# Patient Record
Sex: MALE | Race: WHITE | Employment: OTHER | URBAN - METROPOLITAN AREA
[De-identification: names, ages, dates, MRNs, and addresses within clinical notes are randomized per-mention and may not be internally consistent; named-entity substitution may affect disease eponyms.]

---

## 2017-01-06 ENCOUNTER — OFFICE VISIT (OUTPATIENT)
Dept: CARDIOLOGY CLINIC | Age: 67
End: 2017-01-06

## 2017-01-06 VITALS
SYSTOLIC BLOOD PRESSURE: 138 MMHG | DIASTOLIC BLOOD PRESSURE: 92 MMHG | HEART RATE: 79 BPM | BODY MASS INDEX: 27.73 KG/M2 | OXYGEN SATURATION: 97 % | HEIGHT: 75 IN | WEIGHT: 223 LBS

## 2017-01-06 DIAGNOSIS — E78.00 PURE HYPERCHOLESTEROLEMIA: ICD-10-CM

## 2017-01-06 DIAGNOSIS — I11.9 BENIGN HYPERTENSIVE HEART DISEASE WITHOUT HEART FAILURE: Primary | ICD-10-CM

## 2017-01-06 DIAGNOSIS — R20.2 PARESTHESIA OF BOTH FEET: ICD-10-CM

## 2017-01-06 DIAGNOSIS — I25.10 CORONARY ARTERY DISEASE INVOLVING NATIVE CORONARY ARTERY OF NATIVE HEART WITHOUT ANGINA PECTORIS: ICD-10-CM

## 2017-01-06 RX ORDER — AMLODIPINE BESYLATE 5 MG/1
5 TABLET ORAL DAILY
Qty: 90 TAB | Refills: 3 | Status: SHIPPED | OUTPATIENT
Start: 2017-01-06 | End: 2017-01-06 | Stop reason: SDUPTHER

## 2017-01-06 NOTE — MR AVS SNAPSHOT
Visit Information Date & Time Provider Department Dept. Phone Encounter #  
 1/6/2017  8:00 AM Samia Pierce MD [facility] [id number] Your Appointments 2/17/2017  8:20 AM  
Follow Up with Samia Pierce MD  
[facility] ([facility]) Appt Note: 6 week follow up Elidia Layne [phone] [facility] [address] Upcoming Health Maintenance Date Due Hepatitis C Screening 1950 DTaP/Tdap/Td series (1 - Tdap) 8/1/1971 FOBT Q 1 YEAR AGE 50-75 8/1/2000 ZOSTER VACCINE AGE 60> 8/1/2010 GLAUCOMA SCREENING Q2Y 8/1/2015 Pneumococcal 65+ Low/Medium Risk (1 of 2 - PCV13) 8/1/2015 MEDICARE YEARLY EXAM 8/1/2015 INFLUENZA AGE 9 TO ADULT 8/1/2016 Allergies as of 1/6/2017  Review Complete On: 1/6/2017 By: Piero Roberts No Known Allergies Current Immunizations  Never Reviewed No immunizations on file. Not reviewed this visit You Were Diagnosed With   
  
 Codes Comments Benign hypertensive heart disease without heart failure    -  Primary ICD-10-CM: I11.9 ICD-9-CM: 402.10 Coronary artery disease involving native coronary artery of native heart without angina pectoris     ICD-10-CM: I25.10 ICD-9-CM: 414.01   
 Pure hypercholesterolemia     ICD-10-CM: E78.00 ICD-9-CM: 272.0 Paresthesia of both feet     ICD-10-CM: R20.2 ICD-9-CM: 146. 0 Vitals BP Pulse Height(growth percentile) Weight(growth percentile) SpO2 BMI  
 (!) 138/92 79 6' 3\" (1.905 m) 223 lb (101.2 kg) 97% 27.87 kg/m2 Smoking Status Current Every Day Smoker BMI and BSA Data Body Mass Index Body Surface Area  
 27.87 kg/m 2 2.31 m 2 Preferred Pharmacy Pharmacy Name Phone [id number] [address] [place] [address] Your Updated Medication List  
  
   
This list is accurate as of: 1/6/17  8:38 AM.  Always use your most recent med list. ALTACE 10 mg capsule Generic drug:  ramipril Take 10 mg by mouth daily. amLODIPine 5 mg tablet Commonly known as:  Estephanie Cater Take 1 Tab by mouth daily. Indications: Hypertension  
  
 aspirin 81 mg tablet Take 81 mg by mouth daily. atorvastatin 40 mg tablet Commonly known as:  LIPITOR  
TAKE 1 TAB BY MOUTH EVERY NIGHT AT BEDTIME.  
  
 cholecalciferol 400 unit Tab tablet Commonly known as:  VITAMIN D3 Take 400 Units by mouth daily. CIALIS 5 mg tablet Generic drug:  tadalafil Take 1 Tab by mouth daily. For BPH. Indications: SYMPTOMATIC BENIGN PROSTATIC HYPERPLASIA * dutasteride 0.5 mg capsule Commonly known as:  AVODART Take 1 Cap by mouth as needed. * dutasteride 0.5 mg capsule Commonly known as:  AVODART Take 1 Cap by mouth Every Friday. VITAMIN B-12 PO Take  by mouth. VITAMIN C 250 mg tablet Generic drug:  ascorbic acid (vitamin C) Take 250 mg by mouth daily. VITAMIN E PO Take  by mouth daily. * Notice: This list has 2 medication(s) that are the same as other medications prescribed for you. Read the directions carefully, and ask your doctor or other care provider to review them with you. Prescriptions Sent to Pharmacy Refills  
 amLODIPine (NORVASC) 5 mg tablet 3 Sig: Take 1 Tab by mouth daily. Indications: Hypertension Class: Normal  
 Pharmacy: [address], [address] Ph #: [phone] Route: Oral  
  
Introducing [facility] & HEALTH SERVICES! Vanessa Jimenez introduces [employer] patient portal. Now you can access parts of your medical record, email your doctor's office, and request medication refills online. 1. In your internet browser, go to [email or web address] 2. Click on the First Time User? Click Here link in the Sign In box. You will see the New Member Sign Up page. 3. Enter your [employer] Access Code exactly as it appears below. You will not need to use this code after youve completed the sign-up process. If you do not sign up before the expiration date, you must request a new code. · [employer] Access Code: [email or web address] Expires: 4/6/2017  7:55 AM 
 
4. Enter the last four digits of your Social Security Number (xxxx) and Date of Birth (mm/dd/yyyy) as indicated and click Submit. You will be taken to the next sign-up page. 5. Create a [employer] ID. This will be your [employer] login ID and cannot be changed, so think of one that is secure and easy to remember. 6. Create a [employer] password. You can change your password at any time. 7. Enter your Password Reset Question and Answer. This can be used at a later time if you forget your password. 8. Enter your e-mail address. You will receive e-mail notification when new information is available in [address] 9. Click Sign Up. You can now view and download portions of your medical record. 10. Click the Download Summary menu link to download a portable copy of your medical information. If you have questions, please visit the Frequently Asked Questions section of the [employer] website. Remember, [employer] is NOT to be used for urgent needs. For medical emergencies, dial 911. Now available from your iPhone and Android! Please provide this summary of care documentation to your next provider. Your primary care clinician is listed as Roshan Rojas. If you have any questions after today's visit, please call [phone].

## 2017-01-06 NOTE — PROGRESS NOTES
1. Have you been to the ER, urgent care clinic since your last visit? Hospitalized since your last visit? No  2. Have you seen or consulted any other health care providers outside of the [address] [place] [address] since your last visit? Include any pap smears or colon screening.  NO

## 2017-01-06 NOTE — PROGRESS NOTES
\"       PATIENT NAME: Rocio Be         77 y.o.      1950              DATE:1/6/2017    REASON FOR VISIT:hypertension. HISTORY OF PRESENT ILLNESS:Blood pressures remain poorly controlled. The problems in the lower extremities are unchanged. He has been told that he has a peripheral neuropathy. Denies chest pain and other symptoms suggestive of angina. Denies BAXTER, PND, orthopnea. Denies palpitation, syncope, presyncope. Denies edema in the lower extremities. PAST MEDICAL HISTORY:   Past Medical History:    Bilateral cataracts                                             Comment:removed in 2005    CAD (coronary artery disease), native coronary*                 Comment:mild by invasive eval (oct 2007)    Calculus of kidney                                            ED (erectile dysfunction)                                     GERD (gastroesophageal reflux disease)                        Gross hematuria                                               Hypercholesterolemia                                          Hypertension                                                  Hypertension                                                  Incomplete bladder emptying                                   Nephrolithiasis                                               Normal nuclear stress test                      06/15/2010      Comment:No ischemia or infarction. EF 64%. Ex time                13:30. Peyronie's disease                                            S/P cardiac cath                                10/05/2007      Comment:LM patent. pLAD <20% plaquing. oLCX 30%. RCA               patent. LVEDP 20.  EF 60%. Mild arterial                hypertension.     Tobacco use disorder                                            Comment:ongoing (July 2011)    Urinary retention                                             PAST SURGICAL HISTORY:   Past Surgical History:    HX CHOLECYSTECTOMY HX HERNIA REPAIR                                Left                 Comment:inguinal    HX SHOULDER ARTHROSCOPY                         Right               HX CATARACT REMOVAL                             Bilateral               HX HEENT                                                         Comment:retina repair    HX UROLOGICAL                                    8/18/2014       Comment:[facility], Dr. Gretchen Madrid Prostatectomy      SOCIAL HISTORY:  Social History    Marital status:              Spouse name:                       Years of education:                 Number of children:               Social History Main Topics    Smoking status: Current Every Day Smoker                                                     Packs/day: 1.00      Years: 25.00       Smokeless status: Never Used                        Comment: less than one PPD; Alcohol use: Yes                Comment: occasionally drinks wine    Drug use: No                ALLERGIES:   No Known Allergies     CURRENT MEDICATIONS:   Current Outpatient Prescriptions:  CYANOCOBALAMIN, VITAMIN B-12, (VITAMIN B-12 PO), Take  by mouth. atorvastatin (LIPITOR) 40 mg tablet, TAKE 1 TAB BY MOUTH EVERY NIGHT AT BEDTIME.  amLODIPine (NORVASC) 2.5 mg tablet, Take 1 Tab by mouth daily. CIALIS 5 mg tablet, Take 1 Tab by mouth daily. For BPH. Indications: SYMPTOMATIC BENIGN PROSTATIC HYPERPLASIA  dutasteride (AVODART) 0.5 mg capsule, Take 1 Cap by mouth as needed. cholecalciferol (VITAMIN D3) 400 unit tab tablet, Take 400 Units by mouth daily. ascorbic acid (VITAMIN C) 250 mg tablet, Take 250 mg by mouth daily. aspirin 81 mg tablet, Take 81 mg by mouth daily. VITAMIN E ACETATE (VITAMIN E PO), Take  by mouth daily. ramipril (ALTACE) 10 mg capsule, Take 10 mg by mouth daily. dutasteride (AVODART) 0.5 mg capsule, Take 1 Cap by mouth Every Friday. No current facility-administered medications for this visit.        REVIEW of SYSTEMS:Respiratory ROS: Please see history of present illness  Cardiovascular ROS: Please see history of present illness     PHYSICAL EXAMINATION:   BP (!) 138/92  Pulse 79  Ht 6' 3\" (1.905 m)  Wt 101.2 kg (223 lb)  SpO2 97%  BMI 27.87 kg/m2  BP Readings from Last 3 Encounters:  01/06/17 : (!) 138/92  12/02/16 : (!) 140/98  09/14/16 : (!) 142/96    Pulse Readings from Last 3 Encounters:  01/06/17 : 79  12/02/16 : (!) 51  02/15/16 : (!) 54    Wt Readings from Last 3 Encounters:  01/06/17 : 101.2 kg (223 lb)  12/02/16 : 97.5 kg (215 lb)  09/14/16 : 99.8 kg (220 lb)    Neck: No jugular venous distention. Carotid upstrokes 2+ without bruits. Chest: Clear to auscultation. Heart: PMI not palpable. Regular rhythm. No murmur or gallop. Abdomen: Nontender without masses or organomegaly. Extremities: No edema. Dorsalis pedis and posterior tibial pulses intact. IMPRESSION:   Hypertension poorly controlled  Coronary artery disease, asymptomatic    PLAN:  Increase amlodipine to 5 mg p.o. daily. Return to office in 5-6 weeks    The diagnoses and plan were discussed with patient. All questions answered. Plan of care agreed to by all concerned. Helen Segundo MD       ,              \"

## 2017-01-09 RX ORDER — AMLODIPINE BESYLATE 5 MG/1
5 TABLET ORAL DAILY
Qty: 90 TAB | Refills: 3 | Status: SHIPPED | OUTPATIENT
Start: 2017-01-09 | End: 2017-02-23 | Stop reason: SDUPTHER

## 2017-02-23 ENCOUNTER — OFFICE VISIT (OUTPATIENT)
Dept: CARDIOLOGY CLINIC | Age: 67
End: 2017-02-23

## 2017-02-23 VITALS
DIASTOLIC BLOOD PRESSURE: 100 MMHG | HEART RATE: 59 BPM | BODY MASS INDEX: 28.97 KG/M2 | WEIGHT: 233 LBS | OXYGEN SATURATION: 98 % | HEIGHT: 75 IN | SYSTOLIC BLOOD PRESSURE: 130 MMHG

## 2017-02-23 DIAGNOSIS — I25.10 CORONARY ARTERY DISEASE INVOLVING NATIVE CORONARY ARTERY OF NATIVE HEART WITHOUT ANGINA PECTORIS: Primary | ICD-10-CM

## 2017-02-23 RX ORDER — AMLODIPINE BESYLATE 10 MG/1
10 TABLET ORAL DAILY
Qty: 90 TAB | Refills: 3 | Status: SHIPPED | OUTPATIENT
Start: 2017-02-23

## 2017-02-23 NOTE — MR AVS SNAPSHOT
Visit Information Date & Time Provider Department Dept. Phone Encounter #  
 2/23/2017  8:20 AM Terence Jacome MD [facility] [phone] [id number] Upcoming Health Maintenance Date Due Hepatitis C Screening 1950 DTaP/Tdap/Td series (1 - Tdap) 8/1/1971 FOBT Q 1 YEAR AGE 50-75 8/1/2000 ZOSTER VACCINE AGE 60> 8/1/2010 GLAUCOMA SCREENING Q2Y 8/1/2015 Pneumococcal 65+ Low/Medium Risk (1 of 2 - PCV13) 8/1/2015 MEDICARE YEARLY EXAM 8/1/2015 INFLUENZA AGE 9 TO ADULT 8/1/2016 Allergies as of 2/23/2017  Review Complete On: 2/23/2017 By: Korina Reese LPN No Known Allergies Current Immunizations  Never Reviewed No immunizations on file. Not reviewed this visit You Were Diagnosed With   
  
 Codes Comments Coronary artery disease involving native coronary artery of native heart without angina pectoris    -  Primary ICD-10-CM: I25.10 ICD-9-CM: 414.01 Vitals BP  
  
  
  
  
  
 (!) 130/100 (BP 1 Location: Left arm, BP Patient Position: Sitting) Vitals History BMI and BSA Data Body Mass Index Body Surface Area  
 29.12 kg/m 2 2.37 m 2 Preferred Pharmacy Pharmacy Name Phone [id number] [address] [place] [address] Your Updated Medication List  
  
   
This list is accurate as of: 2/23/17  9:27 AM.  Always use your most recent med list. ALTACE 10 mg capsule Generic drug:  ramipril Take 10 mg by mouth daily. amLODIPine 10 mg tablet Commonly known as:  Suzon Salle Take 1 Tab by mouth daily. Indications: hypertension  
  
 aspirin 81 mg tablet Take 81 mg by mouth daily. atorvastatin 40 mg tablet Commonly known as:  LIPITOR  
TAKE 1 TAB BY MOUTH EVERY NIGHT AT BEDTIME.  
  
 cholecalciferol 400 unit Tab tablet Commonly known as:  VITAMIN D3 Take 400 Units by mouth daily. CIALIS 5 mg tablet Generic drug:  tadalafil Take 1 Tab by mouth daily. For BPH. Indications: SYMPTOMATIC BENIGN PROSTATIC HYPERPLASIA  
  
 dutasteride 0.5 mg capsule Commonly known as:  AVODART Take 1 Cap by mouth Every Friday. VITAMIN B-12 PO Take  by mouth. VITAMIN C 250 mg tablet Generic drug:  ascorbic acid (vitamin C) Take 250 mg by mouth daily. VITAMIN E PO Take  by mouth daily. Prescriptions Printed Refills  
 amLODIPine (NORVASC) 10 mg tablet 3 Sig: Take 1 Tab by mouth daily. Indications: hypertension Class: Print Route: Oral  
  
We Performed the Following AMB POC EKG ROUTINE W/ 12 LEADS, INTER & REP [59236 CPT(R)] Introducing [facility] & HEALTH SERVICES! Kulwant Barraza introduces [employer] patient portal. Now you can access parts of your medical record, email your doctor's office, and request medication refills online. 1. In your internet browser, go to [email or web address] at work 2. Click on the First Time User? Click Here link in the Sign In box. You will see the New Member Sign Up page. 3. Enter your [employer] Access Code exactly as it appears below. You will not need to use this code after youve completed the sign-up process. If you do not sign up before the expiration date, you must request a new code. · [employer] Access Code: [email or web address] Expires: 4/6/2017  7:55 AM 
 
4. Enter the last four digits of your Social Security Number (xxxx) and Date of Birth (mm/dd/yyyy) as indicated and click Submit. You will be taken to the next sign-up page. 5. Create a "[employer]"t ID. This will be your [employer] login ID and cannot be changed, so think of one that is secure and easy to remember. 6. Create a [employer] password. You can change your password at any time. 7. Enter your Password Reset Question and Answer. This can be used at a later time if you forget your password. 8. Enter your e-mail address.  You will receive e-mail notification when new information is available in [employer]. 9. Click Sign Up. You can now view and download portions of your medical record. 10. Click the Download Summary menu link to download a portable copy of your medical information. If you have questions, please visit the Frequently Asked Questions section of the [employer] website. Remember, [employer] is NOT to be used for urgent needs. For medical emergencies, dial 911. Now available from your iPhone and Android! Please provide this summary of care documentation to your next provider. Your primary care clinician is listed as Lizzy Lay. If you have any questions after today's visit, please call [phone].

## 2017-02-23 NOTE — PROGRESS NOTES
1. Have you been to the ER, urgent care clinic since your last visit? Hospitalized since your last visit? No    2. Have you seen or consulted any other health care providers outside of the [address] [place] [address] since your last visit? Include any pap smears or colon screening.  No    Verbal order and read back per Marlyn Benites MD

## 2017-02-23 NOTE — PROGRESS NOTES
PATIENT NAME: Fortino Peña         77 y.o.      1950              DATE:2/23/2017    REASON FOR VISIT: Hypertension    HISTORY OF PRESENT ILLNESS: Blood pressures remain poorly controlled. Denies chest pain and other symptoms suggestive of angina. Denies BAXTER, PND, orthopnea. Denies palpitation, syncope, presyncope. Denies edema in the lower extremities. He has had a renal ultrasound. Results pending    PAST MEDICAL HISTORY:   Past Medical History:  No date: Bilateral cataracts      Comment: removed in 2005  No date: CAD (coronary artery disease), native coronary*      Comment: mild by invasive eval (oct 2007)  No date: Calculus of kidney  No date: ED (erectile dysfunction)  No date: GERD (gastroesophageal reflux disease)  No date: Gross hematuria  No date: Hypercholesterolemia  No date: Hypertension  No date: Hypertension  No date: Incomplete bladder emptying  No date: Nephrolithiasis  06/15/2010: Normal nuclear stress test      Comment: No ischemia or infarction. EF 64%. Ex time                13:30. No date: Peyronie's disease  10/05/2007: S/P cardiac cath      Comment: LM patent. pLAD <20% plaquing. oLCX 30%. RCA patent. LVEDP 20.  EF 60%. Mild arterial                hypertension.   No date: Tobacco use disorder      Comment: ongoing (July 2011)  No date: Urinary retention    PAST SURGICAL HISTORY:   Past Surgical History:  No date: HX CATARACT REMOVAL Bilateral  No date: HX CHOLECYSTECTOMY  No date: HX HEENT      Comment: retina repair  No date: HX HERNIA REPAIR Left      Comment: inguinal  No date: HX SHOULDER ARTHROSCOPY Right  8/18/2014: HX UROLOGICAL      Comment: [facility], Dr. Jenna Spence, [place] Prostatectomy      SOCIAL HISTORY:  Social History    Marital status:              Spouse name:                       Years of education:                 Number of children:               Social History Main Topics    Smoking status: Current Every Day Smoker Packs/day: 1.00      Years: 25.00       Smokeless status: Never Used                        Comment: less than one PPD; Alcohol use: Yes                Comment: occasionally drinks wine    Drug use: No                ALLERGIES:   No Known Allergies     CURRENT MEDICATIONS:   Current Outpatient Prescriptions:  amLODIPine (NORVASC) 10 mg tablet, Take 1 Tab by mouth daily. Indications: hypertension  CYANOCOBALAMIN, VITAMIN B-12, (VITAMIN B-12 PO), Take  by mouth. atorvastatin (LIPITOR) 40 mg tablet, TAKE 1 TAB BY MOUTH EVERY NIGHT AT BEDTIME. CIALIS 5 mg tablet, Take 1 Tab by mouth daily. For BPH. Indications: SYMPTOMATIC BENIGN PROSTATIC HYPERPLASIA  dutasteride (AVODART) 0.5 mg capsule, Take 1 Cap by mouth Every Friday. cholecalciferol (VITAMIN D3) 400 unit tab tablet, Take 400 Units by mouth daily. ascorbic acid (VITAMIN C) 250 mg tablet, Take 250 mg by mouth daily. aspirin 81 mg tablet, Take 81 mg by mouth daily. VITAMIN E ACETATE (VITAMIN E PO), Take  by mouth daily. ramipril (ALTACE) 10 mg capsule, Take 10 mg by mouth daily. No current facility-administered medications for this visit. REVIEW of SYSTEMS:History obtained from the patient  Cardiovascular ROS: Please see history of present illness     PHYSICAL EXAMINATION:   BP (!) 130/100 (BP 1 Location: Left arm, BP Patient Position: Sitting)  Pulse (!) 59  Ht 6' 3\" (1.905 m)  Wt 105.7 kg (233 lb)  SpO2 98%  BMI 29.12 kg/m2  BP Readings from Last 3 Encounters:  02/23/17 : (!) 130/100  01/06/17 : (!) 138/92  12/02/16 : (!) 140/98    Pulse Readings from Last 3 Encounters:  02/23/17 : (!) 59  01/06/17 : 79  12/02/16 : (!) 51    Wt Readings from Last 3 Encounters:  02/23/17 : 105.7 kg (233 lb)  01/06/17 : 101.2 kg (223 lb)  12/02/16 : 97.5 kg (215 lb)    HEENT: Sclera clear. Mucous membranes pink and moist.  Neck: No jugular venous distention. Carotid upstrokes 2+ without bruits.   Chest: Clear to percussion and auscultation. Heart: PMI not palpable. Regular rhythm. No murmur or gallop. Abdomen: Nontender without masses or organomegaly. Extremities: No edema. .  Skin: Warm and dry. No stasis changes. Neuro: Alert, oriented, speech WNL, no facial asymmetry. Gait WNL. IMPRESSION:   Hypertension poorly controlled    PLAN  Increase amlodipine to 10 mg daily return in 1 month    The diagnoses and plan were discussed with patient. All questions answered. Plan of care agreed to by all concerned. Sola Larson.  MD Sanya       ,

## 2017-07-05 ENCOUNTER — ANESTHESIA EVENT (OUTPATIENT)
Dept: ENDOSCOPY | Age: 67
End: 2017-07-05
Payer: MEDICARE

## 2017-07-06 ENCOUNTER — ANESTHESIA (OUTPATIENT)
Dept: ENDOSCOPY | Age: 67
End: 2017-07-06
Payer: MEDICARE

## 2017-07-06 ENCOUNTER — HOSPITAL ENCOUNTER (OUTPATIENT)
Age: 67
Setting detail: OUTPATIENT SURGERY
Discharge: HOME | End: 2017-07-06
Attending: INTERNAL MEDICINE | Admitting: INTERNAL MEDICINE
Payer: MEDICARE

## 2017-07-06 VITALS
SYSTOLIC BLOOD PRESSURE: 107 MMHG | RESPIRATION RATE: 34 BRPM | TEMPERATURE: 98.4 F | HEART RATE: 63 BPM | OXYGEN SATURATION: 100 % | WEIGHT: 220 LBS | BODY MASS INDEX: 27.35 KG/M2 | HEIGHT: 75 IN | DIASTOLIC BLOOD PRESSURE: 70 MMHG

## 2017-07-06 PROCEDURE — 76060000032 HC ANESTHESIA 0.5 TO 1 HR: Performed by: INTERNAL MEDICINE

## 2017-07-06 PROCEDURE — 74011250636 HC RX REV CODE- 250/636

## 2017-07-06 PROCEDURE — 88305 TISSUE EXAM BY PATHOLOGIST: CPT | Performed by: INTERNAL MEDICINE

## 2017-07-06 PROCEDURE — 74011000250 HC RX REV CODE- 250

## 2017-07-06 PROCEDURE — 77030013992 HC SNR POLYP ENDOSC BSC -B: Performed by: INTERNAL MEDICINE

## 2017-07-06 PROCEDURE — 74011000250 HC RX REV CODE- 250: Performed by: NURSE ANESTHETIST, CERTIFIED REGISTERED

## 2017-07-06 PROCEDURE — 76040000007: Performed by: INTERNAL MEDICINE

## 2017-07-06 PROCEDURE — 74011250636 HC RX REV CODE- 250/636: Performed by: NURSE ANESTHETIST, CERTIFIED REGISTERED

## 2017-07-06 RX ORDER — SODIUM CHLORIDE, SODIUM LACTATE, POTASSIUM CHLORIDE, CALCIUM CHLORIDE 600; 310; 30; 20 MG/100ML; MG/100ML; MG/100ML; MG/100ML
75 INJECTION, SOLUTION INTRAVENOUS CONTINUOUS
Status: DISCONTINUED | OUTPATIENT
Start: 2017-07-06 | End: 2017-07-06 | Stop reason: HOSPADM

## 2017-07-06 RX ORDER — PROPOFOL 10 MG/ML
INJECTION, EMULSION INTRAVENOUS AS NEEDED
Status: DISCONTINUED | OUTPATIENT
Start: 2017-07-06 | End: 2017-07-06 | Stop reason: HOSPADM

## 2017-07-06 RX ORDER — LIDOCAINE HYDROCHLORIDE 20 MG/ML
INJECTION, SOLUTION EPIDURAL; INFILTRATION; INTRACAUDAL; PERINEURAL AS NEEDED
Status: DISCONTINUED | OUTPATIENT
Start: 2017-07-06 | End: 2017-07-06 | Stop reason: HOSPADM

## 2017-07-06 RX ORDER — SODIUM CHLORIDE 0.9 % (FLUSH) 0.9 %
5-10 SYRINGE (ML) INJECTION EVERY 8 HOURS
Status: DISCONTINUED | OUTPATIENT
Start: 2017-07-06 | End: 2017-07-06 | Stop reason: HOSPADM

## 2017-07-06 RX ORDER — GLYCOPYRROLATE 0.2 MG/ML
INJECTION INTRAMUSCULAR; INTRAVENOUS AS NEEDED
Status: DISCONTINUED | OUTPATIENT
Start: 2017-07-06 | End: 2017-07-06 | Stop reason: HOSPADM

## 2017-07-06 RX ORDER — SODIUM CHLORIDE 0.9 % (FLUSH) 0.9 %
5-10 SYRINGE (ML) INJECTION AS NEEDED
Status: DISCONTINUED | OUTPATIENT
Start: 2017-07-06 | End: 2017-07-06 | Stop reason: HOSPADM

## 2017-07-06 RX ADMIN — PROPOFOL 50 MG: 10 INJECTION, EMULSION INTRAVENOUS at 10:33

## 2017-07-06 RX ADMIN — LIDOCAINE HYDROCHLORIDE 40 MG: 20 INJECTION, SOLUTION EPIDURAL; INFILTRATION; INTRACAUDAL; PERINEURAL at 10:18

## 2017-07-06 RX ADMIN — PROPOFOL 50 MG: 10 INJECTION, EMULSION INTRAVENOUS at 10:20

## 2017-07-06 RX ADMIN — PROPOFOL 50 MG: 10 INJECTION, EMULSION INTRAVENOUS at 10:42

## 2017-07-06 RX ADMIN — GLYCOPYRROLATE 0.2 MG: 0.2 INJECTION INTRAMUSCULAR; INTRAVENOUS at 10:30

## 2017-07-06 RX ADMIN — PROPOFOL 100 MG: 10 INJECTION, EMULSION INTRAVENOUS at 10:18

## 2017-07-06 RX ADMIN — SODIUM CHLORIDE, SODIUM LACTATE, POTASSIUM CHLORIDE, AND CALCIUM CHLORIDE 75 ML/HR: 600; 310; 30; 20 INJECTION, SOLUTION INTRAVENOUS at 09:38

## 2017-07-06 RX ADMIN — PROPOFOL 50 MG: 10 INJECTION, EMULSION INTRAVENOUS at 10:30

## 2017-07-06 RX ADMIN — FAMOTIDINE 20 MG: 10 INJECTION, SOLUTION INTRAVENOUS at 09:40

## 2017-07-06 RX ADMIN — PROPOFOL 50 MG: 10 INJECTION, EMULSION INTRAVENOUS at 10:23

## 2017-07-06 RX ADMIN — PROPOFOL 50 MG: 10 INJECTION, EMULSION INTRAVENOUS at 10:26

## 2017-07-06 RX ADMIN — PROPOFOL 50 MG: 10 INJECTION, EMULSION INTRAVENOUS at 10:36

## 2017-07-06 NOTE — H&P
[email or web address]  [phone]    Gastroenterology pre op History and Physical     Impression:   1. High risk colon cancer screening exam      Plan:     1. Colonoscopy      Chief Complaint: high risk colon cancer screening exam.      HPI:  Melissa Lcuero is a 77 y.o. male who is being seen on consult for high risk colon cancer screening with colonoscopy. There is a previous history of colon polyps, and the patient returns for a surveillence exam    PMH:   Past Medical History:   Diagnosis Date    Bilateral cataracts     removed in 2005    CAD (coronary artery disease), native coronary artery     mild by invasive eval (oct 2007)    Calculus of kidney     ED (erectile dysfunction)     GERD (gastroesophageal reflux disease)     Gross hematuria     Hypercholesterolemia     Hypertension     Hypertension     Incomplete bladder emptying     Nephrolithiasis     Normal nuclear stress test 06/15/2010    No ischemia or infarction. EF 64%. Ex time 13:30.    Peyronie's disease     S/P cardiac cath 10/05/2007    LM patent. pLAD <20% plaquing. oLCX 30%. RCA patent. LVEDP 20.  EF 60%. Mild arterial hypertension.  Tobacco use disorder     ongoing (July 2011)    Urinary retention        PSH:   Past Surgical History:   Procedure Laterality Date    HX CATARACT REMOVAL Bilateral     HX CHOLECYSTECTOMY      HX HEENT      retina repair    HX HERNIA REPAIR Left     inguinal    HX SHOULDER ARTHROSCOPY Right     HX UROLOGICAL  8/18/2014    [facility], Dr. Pamella Pratt, Button Prostatectomy       Social HX:   Social History     Social History    Marital status:      Spouse name: N/A    Number of children: N/A    Years of education: N/A     Occupational History    Not on file.      Social History Main Topics    Smoking status: Current Every Day Smoker     Packs/day: 1.00     Years: 25.00    Smokeless tobacco: Never Used      Comment: less than one PPD;     Alcohol use Yes      Comment: occasionally drinks wine    Drug use: No    Sexual activity: Not on file     Other Topics Concern    Not on file     Social History Narrative       FHX:   Family History   Problem Relation Age of Onset    Pacemaker Mother     Heart Attack Father        Allergy:   No Known Allergies    Home Medications:     Prescriptions Prior to Admission   Medication Sig    amLODIPine (NORVASC) 10 mg tablet Take 1 Tab by mouth daily. Indications: hypertension    ramipril (ALTACE) 10 mg capsule Take 10 mg by mouth daily.  CYANOCOBALAMIN, VITAMIN B-12, (VITAMIN B-12 PO) Take  by mouth.  atorvastatin (LIPITOR) 40 mg tablet TAKE 1 TAB BY MOUTH EVERY NIGHT AT BEDTIME.  CIALIS 5 mg tablet Take 1 Tab by mouth daily. For BPH. Indications: SYMPTOMATIC BENIGN PROSTATIC HYPERPLASIA    dutasteride (AVODART) 0.5 mg capsule Take 1 Cap by mouth Every Friday.  cholecalciferol (VITAMIN D3) 400 unit tab tablet Take 400 Units by mouth daily.  ascorbic acid (VITAMIN C) 250 mg tablet Take 250 mg by mouth daily.  aspirin 81 mg tablet Take 81 mg by mouth daily.  VITAMIN E ACETATE (VITAMIN E PO) Take  by mouth daily. Review of Systems:     Constitutional: No fevers, chills, weight loss, fatigue. Skin: No rashes, pruritis, jaundice, ulcerations, erythema. HENT: No headaches, nosebleeds, sinus pressure, rhinorrhea, sore throat. Eyes: No visual changes, blurred vision, eye pain, photophobia, jaundice. Cardiovascular: No chest pain, heart palpitations. Respiratory: No cough, SOB, wheezing, chest discomfort, orthopnea. Gastrointestinal:    Genitourinary: No dysuria, bleeding, discharge, pyuria. Musculoskeletal: No weakness, arthralgias, wasting. Endo: No sweats. Heme: No bruising, easy bleeding. Allergies: As noted. Neurological: Cranial nerves intact. Alert and oriented. Gait not assessed. Psychiatric:  No anxiety, depression, hallucinations.                  Visit Vitals    /78    Pulse (!) 54    Temp 98.4 °F (36.9 °C)    Resp 20    Ht 6' 3\" (1.905 m)    Wt 99.8 kg (220 lb)    SpO2 99%    BMI 27.5 kg/m2       Physical Assessment:     constitutional: appearance: well developed, well nourished, normal habitus, no deformities, in no acute distress. skin: inspection: no rashes, ulcers, icterus or other lesions; no clubbing or telangiectasias. palpation: no induration or subcutaneos nodules. eyes: inspection: normal conjunctivae and lids; no jaundice pupils: symmetrical, normoreactive to light, normal accommodation and size. ENMT: mouth: normal oral mucosa,lips and gums; good dentition. oropharynx: normal tongue, hard and soft palate; posterior pharynx without erithema, exudate or lesions. neck: thyroid: normal size, consistency and position; no masses or tenderness. respiratory: effort: normal chest excursion; no intercostal retraction or accessory muscle use. cardiovascular: abdominal aorta: normal size and position; no bruits. palpation: PMI of normal size and position; normal rhythm; no thrill or murmurs. abdominal: abdomen: normal consistency; no tenderness or masses. hernias: no hernias appreciated. liver: normal size and consistency. spleen: not palpable. rectal: hemoccult/guaiac: not performed. musculoskeletal: digits and nails: no clubbing, cyanosis, petechiae or other inflammatory conditions. gait: normal gait and station head and neck: normal range of motion; no pain, crepitation or contracture. spine/ribs/pelvis: normal range of motion; no pain, deformity or contracture. lymphatic: axilae: not palpable. groin: not palpable. neck: within normal limits. other: not palpable. neurologic: cranial nerves: II-XII normal.   psychiatric: judgement/insight: within normal limits. memory: within normal limits for recent and remote events. mood and affect: no evidence of depression, anxiety or agitation. orientation: oriented to time, space and person.         Basic Metabolic Profile   No results for input(s): NA, K, CL, CO2, BUN, GLU, CA, MG, PHOS in the last 72 hours. No lab exists for component: CREAT      CBC w/Diff    No results for input(s): WBC, RBC, HGB, HCT, MCV, MCH, MCHC, RDW, PLT, HGBEXT, HCTEXT, PLTEXT in the last 72 hours. No lab exists for component: MPV No results for input(s): GRANS, LYMPH, EOS, PRO, MYELO, METAS, BLAST in the last 72 hours. No lab exists for component: MONO, BASO     Hepatic Function   No results for input(s): ALB, TP, TBILI, GPT, SGOT, AP, AML, LPSE in the last 72 hours. No lab exists for component: GABBY Deluna MD  [facility] 1947, [address] [facility]  [email or web address]

## 2017-07-06 NOTE — DISCHARGE INSTRUCTIONS
Colonoscopy: What to Expect at [address] [place] [address]  After you have a colonoscopy, you will stay at the clinic for 1 to 2 hours until the medicines wear off. Then you can go home. But you will need to arrange for a ride. Your doctor will tell you when you can eat and do your other usual activities. Your doctor will talk to you about when you will need your next colonoscopy. Your doctor can help you decide how often you need to be checked. This will depend on the results of your test and your risk for colorectal cancer. After the test, you may be bloated or have gas pains. You may need to pass gas. If a biopsy was done or a polyp was removed, you may have streaks of blood in your stool (feces) for a few days. This care sheet gives you a general idea about how long it will take for you to recover. But each person recovers at a different pace. Follow the steps below to get better as quickly as possible. How can you care for yourself at home? Activity  · Rest when you feel tired. · You can do your normal activities when it feels okay to do so. Diet  · Follow your doctor's directions for eating. · Unless your doctor has told you not to, drink plenty of fluids. This helps to replace the fluids that were lost during the colon prep. · Do not drink alcohol. Medicines  · If polyps were removed or a biopsy was done during the test, your doctor may tell you not to take aspirin or other anti-inflammatory medicines for a few days. These include ibuprofen (Advil, Motrin) and naproxen (Aleve). Other instructions  · For your safety, do not drive or operate machinery until the medicine wears off and you can think clearly. Your doctor may tell you not to drive or operate machinery until the day after your test.  · Do not sign legal documents or make major decisions until the medicine wears off and you can think clearly. The anesthesia can make it hard for you to fully understand what you are agreeing to.   Follow-up care is a key part of your treatment and safety. Be sure to make and go to all appointments, and call your doctor if you are having problems. It's also a good idea to know your test results and keep a list of the medicines you take. When should you call for help? Call 911 anytime you think you may need emergency care. For example, call if:  · You passed out (lost consciousness). · You pass maroon or bloody stools. · You have severe belly pain. Call your doctor now or seek immediate medical care if:  · Your stools are black and tarlike. · Your stools have streaks of blood, but you did not have a biopsy or any polyps removed. · You have belly pain, or your belly is swollen and firm. · You vomit. · You have a fever. · You are very dizzy. Watch closely for changes in your health, and be sure to contact your doctor if you have any problems. Where can you learn more? Go to [employer]  Enter E264 in the search box to learn more about \"Colonoscopy: What to Expect at Home. \"   © 6365-4622 Healthwise, Incorporated. Care instructions adapted under license by [employer] (which disclaims liability or warranty for this information). This care instruction is for use with your licensed healthcare professional. If you have questions about a medical condition or this instruction, always ask your healthcare professional. [place] [address] any warranty or liability for your use of this information. Content Version: 08.3.763183; Current as of: November 14, 2014      DISCHARGE SUMMARY from Nurse     POST-PROCEDURE INSTRUCTIONS:    Call your Physician if you:  ? Observe any excess bleeding. ? Develop a temperature over 100.5o F.  ? Experience abdominal, shoulder or chest pain. ? Notice any signs of decreased circulation or nerve impairment to an extremity such as a change in color, persistent numbness, tingling, coldness or increase in pain. ?  Vomit blood or you have nausea and vomiting lasting longer than 4 hours. ? Are unable to take medications. ? Are unable to urinate within 8 hours after discharge following general anesthesia or intravenous sedation. For the next 24 hours after receiving general anesthesia or intravenous sedation, or while taking prescription Narcotics, limit your activities:  ? Do NOT drive a motor vehicle, operate hazard machinery or power tools, or perform tasks that require coordination. The medication you received during your procedure may have some effect on your mental awareness. ? Do NOT make important personal or business decisions. The medication you received during your procedure may have some effect on your mental awareness. ? Do NOT drink alcoholic beverages. These drinks do not mix well with the medications that have been given to you. ? Upon discharge from the hospital, you must be accompanied by a responsible adult. ? Resume your diet as directed by your physician. ? Resume medications as your physician has prescribed. ? Please give a list of your current medications to your Primary Care Provider. ? Please update this list whenever your medications are discontinued, doses are changed, or new medications (including over-the-counter products) are added. ? Please carry medication information at all times in case of emergency situations. These are general instructions for a healthy lifestyle:    No smoking/ No tobacco products/ Avoid exposure to second hand smoke.  Surgeon General's Warning:  Quitting smoking now greatly reduces serious risk to your health. Obesity, smoking, and a sedentary lifestyle greatly increase your risk for illness.    A healthy diet, regular physical exercise & weight monitoring are important for maintaining a healthy lifestyle   You may be retaining fluid if you have a history of heart failure or if you experience any of the following symptoms:  Weight gain of 3 pounds or more overnight or 5 pounds in a week, increased swelling in our hands or feet or shortness of breath while lying flat in bed. Please call your doctor as soon as you notice any of these symptoms; do not wait until your next office visit. Recognize signs and symptoms of STROKE:  F  -  Face looks uneven  A  -  Arms unable to move or move unevenly  S  -  Speech slurred or non-existent  T  -  Time to call 911 - as soon as signs and symptoms begin - DO NOT go back to bed or wait to see If you get better - TIME IS BRAIN. Colorectal Screening   Colorectal cancer almost always develops from precancerous polyps (abnormal growths) in the colon or rectum. Screening tests can find precancerous polyps, so that they can be removed before they turn into cancer. Screening tests can also find colorectal cancer early, when treatment works best.  Selena Flores Speak with your physician about when you should begin screening and how often you should be tested. Additional Information    If you have questions, please call [phone]. Remember, [employer] is NOT to be used for urgent needs. For medical emergencies, dial 911. Educational references and/or instructions provided during this visit included:    Colon Polyps      Discharge information has been reviewed with the patient. The patient     Colon Polyps: Care Instructions  Your Care Instructions    Colon polyps are growths in the colon or the rectum. The cause of most colon polyps is not known, and most people who get them do not have any problems. But a certain kind can turn into cancer. For this reason, regular testing for colon polyps is important for people age 48 and older and anyone who has an increased risk for colon cancer. Polyps are usually found through routine colon cancer screening tests. Although most colon polyps are not cancerous, they are usually removed and then tested for cancer. Screening for colon cancer saves lives because the cancer can usually be cured if it is caught early.   If you have a polyp that is the type that can turn into cancer, you may need more tests to examine your entire colon. The doctor will remove any other polyps that he or she finds, and you will be tested more often. Follow-up care is a key part of your treatment and safety. Be sure to make and go to all appointments, and call your doctor if you are having problems. It's also a good idea to know your test results and keep a list of the medicines you take. How can you care for yourself at home? Regular exams to look for colon polyps are the best way to prevent polyps from turning into colon cancer. These can include stool tests, sigmoidoscopy, colonoscopy, and CT colonography. Talk with your doctor about a testing schedule that is right for you. To prevent polyps  There is no home treatment that can prevent colon polyps. But these steps may help lower your risk for cancer. · Stay active. Being active can help you get to and stay at a healthy weight. Try to exercise on most days of the week. Walking is a good choice. · Eat well. Choose a variety of vegetables, fruits, legumes (such as peas and beans), fish, poultry, and whole grains. · Do not smoke. If you need help quitting, talk to your doctor about stop-smoking programs and medicines. These can increase your chances of quitting for good. · If you drink alcohol, limit how much you drink. Limit alcohol to 2 drinks a day for men and 1 drink a day for women. When should you call for help? Call your doctor now or seek immediate medical care if:  · You have severe belly pain. · Your stools are maroon or very bloody. Watch closely for changes in your health, and be sure to contact your doctor if:  · You have a fever. · You have nausea or vomiting. · You have a change in bowel habits (new constipation or diarrhea). · Your symptoms get worse or are not improving as expected. Where can you learn more? Go to http://richy-debbi.info/.   Enter Z320 in the search box to learn more about \"Colon Polyps: Care Instructions. \"  Current as of: May 5, 2017  Content Version: 11.3  © 1934-0398 [employer]. Care instructions adapted under license by [employer] (which disclaims liability or warranty for this information). If you have questions about a medical condition or this instruction, always ask your healthcare professional. [place] [address] any warranty or liability for your use of this information. verbalized understanding.

## 2017-07-06 NOTE — PROGRESS NOTES
[email or web address] [address]  [address] [facility], [address]      Brief Procedure Note    Khadar Obrien  1950  [id number]    Date of Procedure: 7/6/2017    Preoperative diagnosis: Personal history of colonic polyps [Z86.010]    Postoperative diagnosis: Colon Polyps, Hemorrhoids    Type of Anesthesia: MAC (Monitored anesthesia care)    Description of findings: same as post op dx    Procedure: Procedure(s):  COLONOSCOPY / polypectomy     :  Dr. John Valentino MD    Assistant(s): Endoscopy Technician-2: [place] [facility]  Endoscopy RN-1: Nicol Jerome RN  Endoscopy RN-Relief: Marely Baron RN    EBL:None    Specimens:   ID Type Source Tests Collected by Time Destination   1 : sigmoid polyp Preservative Sigmoid  John Valentino MD 7/6/2017 1021 Pathology   2 : Descending Polyps Preservative Colon, Descending  John Valentino MD 7/6/2017 1038 Pathology       Findings: See printed and scanned procedure note    Complications: None    Dr. John Valentino MD  7/6/2017  10:51 AM

## 2017-07-06 NOTE — IP AVS SNAPSHOT
[address] [address] [address] [address] [address] [id number] [phone] Patient: Tray Bañuelos. MRN: [id number] AMZ:[id number] You are allergic to the following No active allergies Recent Documentation Height Weight BMI Smoking Status 1.905 m 99.8 kg 27.5 kg/m2 Current Every Day Smoker Emergency Contacts Name Discharge Info Relation Home Work Mobile Vidhya,Lucrecia DISCHARGE CAREGIVER [3] Spouse [3] [phone] About your hospitalization You were admitted on:  July 6, 2017 You last received care in the:  [facility] You were discharged on:  July 6, 2017 Unit phone number:  [phone] Why you were hospitalized Your primary diagnosis was:  Not on File Providers Seen During Your Hospitalizations Provider Role Specialty Primary office phone Brandan Spain MD Attending Provider Gastroenterology [phone] Your Primary Care Physician (PCP) Primary Care Physician Office Phone Office Fax Meeta Baires [phone] Follow-up Information None Current Discharge Medication List  
  
ASK your doctor about these medications Dose & Instructions Dispensing Information Comments Morning Noon Evening Bedtime ALTACE 10 mg capsule Generic drug:  ramipril Your last dose was: Your next dose is:    
   
   
 Dose:  10 mg Take 10 mg by mouth daily. Refills:  0  
     
   
   
   
  
 amLODIPine 10 mg tablet Commonly known as:  Tor Shield Your last dose was: Your next dose is:    
   
   
 Dose:  10 mg Take 1 Tab by mouth daily. Indications: hypertension Quantity:  90 Tab Refills:  3  
     
   
   
   
  
 aspirin 81 mg tablet Your last dose was: Your next dose is:    
   
   
 Dose:  81 mg Take 81 mg by mouth daily. Refills:  0 atorvastatin 40 mg tablet Commonly known as:  LIPITOR Your last dose was: Your next dose is: TAKE 1 TAB BY MOUTH EVERY NIGHT AT BEDTIME. Refills:  5  
     
   
   
   
  
 cholecalciferol 400 unit Tab tablet Commonly known as:  VITAMIN D3 Your last dose was: Your next dose is:    
   
   
 Dose:  400 Units Take 400 Units by mouth daily. Refills:  0 CIALIS 5 mg tablet Generic drug:  tadalafil Your last dose was: Your next dose is:    
   
   
 Dose:  5 mg Take 1 Tab by mouth daily. For BPH. Indications: SYMPTOMATIC BENIGN PROSTATIC HYPERPLASIA Quantity:  90 Tab Refills:  3  
     
   
   
   
  
 dutasteride 0.5 mg capsule Commonly known as:  AVODART Your last dose was: Your next dose is:    
   
   
 Dose:  0.5 mg Take 1 Cap by mouth Every Friday. Quantity:  50 Cap Refills:  3 VITAMIN B-12 PO Your last dose was: Your next dose is: Take  by mouth. Refills:  0  
     
   
   
   
  
 VITAMIN C 250 mg tablet Generic drug:  ascorbic acid (vitamin C) Your last dose was: Your next dose is:    
   
   
 Dose:  250 mg Take 250 mg by mouth daily. Refills:  0  
     
   
   
   
  
 VITAMIN E PO Your last dose was: Your next dose is: Take  by mouth daily. Refills:  0 Discharge Instructions Colonoscopy: What to Expect at [facility] Your Recovery After you have a colonoscopy, you will stay at the clinic for 1 to 2 hours until the medicines wear off. Then you can go home. But you will need to arrange for a ride. Your doctor will tell you when you can eat and do your other usual activities. Your doctor will talk to you about when you will need your next colonoscopy.  Your doctor can help you decide how often you need to be checked. This will depend on the results of your test and your risk for colorectal cancer. After the test, you may be bloated or have gas pains. You may need to pass gas. If a biopsy was done or a polyp was removed, you may have streaks of blood in your stool (feces) for a few days. This care sheet gives you a general idea about how long it will take for you to recover. But each person recovers at a different pace. Follow the steps below to get better as quickly as possible. How can you care for yourself at home? Activity · Rest when you feel tired. · You can do your normal activities when it feels okay to do so. Diet · Follow your doctor's directions for eating. · Unless your doctor has told you not to, drink plenty of fluids. This helps to replace the fluids that were lost during the colon prep. · Do not drink alcohol. Medicines · If polyps were removed or a biopsy was done during the test, your doctor may tell you not to take aspirin or other anti-inflammatory medicines for a few days. These include ibuprofen (Advil, Motrin) and naproxen (Aleve). Other instructions · For your safety, do not drive or operate machinery until the medicine wears off and you can think clearly. Your doctor may tell you not to drive or operate machinery until the day after your test. 
· Do not sign legal documents or make major decisions until the medicine wears off and you can think clearly. The anesthesia can make it hard for you to fully understand what you are agreeing to. Follow-up care is a key part of your treatment and safety. Be sure to make and go to all appointments, and call your doctor if you are having problems. It's also a good idea to know your test results and keep a list of the medicines you take. When should you call for help? Call 911 anytime you think you may need emergency care. For example, call if: 
· You passed out (lost consciousness). · You pass maroon or bloody stools. · You have severe belly pain. Call your doctor now or seek immediate medical care if: 
· Your stools are black and tarlike. · Your stools have streaks of blood, but you did not have a biopsy or any polyps removed. · You have belly pain, or your belly is swollen and firm. · You vomit. · You have a fever. · You are very dizzy. Watch closely for changes in your health, and be sure to contact your doctor if you have any problems. Where can you learn more? Go to Sazneo.be Enter E264 in the search box to learn more about \"Colonoscopy: What to Expect at Home. \"  
© 7811-5400 Healthwise, [employer]. Care instructions adapted under license by [employer] (which disclaims liability or warranty for this information). This care instruction is for use with your licensed healthcare professional. If you have questions about a medical condition or this instruction, always ask your healthcare professional. [address] [place] [address] any warranty or liability for your use of this information. Content Version: 76.1.400948; Current as of: November 14, 2014 DISCHARGE SUMMARY from Nurse POST-PROCEDURE INSTRUCTIONS: 
 
Call your Physician if you: 
? Observe any excess bleeding. ? Develop a temperature over 100.5o F. 
? Experience abdominal, shoulder or chest pain. ? Notice any signs of decreased circulation or nerve impairment to an extremity such as a change in color, persistent numbness, tingling, coldness or increase in pain. ? Vomit blood or you have nausea and vomiting lasting longer than 4 hours. ? Are unable to take medications. ? Are unable to urinate within 8 hours after discharge following general anesthesia or intravenous sedation.  
 
For the next 24 hours after receiving general anesthesia or intravenous sedation, or while taking prescription Narcotics, limit your activities: 
? Do NOT drive a motor vehicle, operate hazard machinery or power tools, or perform tasks that require coordination. The medication you received during your procedure may have some effect on your mental awareness. ? Do NOT make important personal or business decisions. The medication you received during your procedure may have some effect on your mental awareness. ? Do NOT drink alcoholic beverages. These drinks do not mix well with the medications that have been given to you. ? Upon discharge from the hospital, you must be accompanied by a responsible adult. ? Resume your diet as directed by your physician. ? Resume medications as your physician has prescribed. ? Please give a list of your current medications to your Primary Care Provider. ? Please update this list whenever your medications are discontinued, doses are changed, or new medications (including over-the-counter products) are added. ? Please carry medication information at all times in case of emergency situations. These are general instructions for a healthy lifestyle: No smoking/ No tobacco products/ Avoid exposure to second hand smoke. ? Surgeon General's Warning:  Quitting smoking now greatly reduces serious risk to your health. Obesity, smoking, and a sedentary lifestyle greatly increase your risk for illness. ? A healthy diet, regular physical exercise & weight monitoring are important for maintaining a healthy lifestyle ? You may be retaining fluid if you have a history of heart failure or if you experience any of the following symptoms:  Weight gain of 3 pounds or more overnight or 5 pounds in a week, increased swelling in our hands or feet or shortness of breath while lying flat in bed. Please call your doctor as soon as you notice any of these symptoms; do not wait until your next office visit. Recognize signs and symptoms of STROKE: 
F  -  Face looks uneven A  -  Arms unable to move or move unevenly S  -  Speech slurred or non-existent T  -  Time to call 911 - as soon as signs and symptoms begin - DO NOT go back to bed or wait to see If you get better - TIME IS BRAIN. Colorectal Screening ? Colorectal cancer almost always develops from precancerous polyps (abnormal growths) in the colon or rectum. Screening tests can find precancerous polyps, so that they can be removed before they turn into cancer. Screening tests can also find colorectal cancer early, when treatment works best. 
? Speak with your physician about when you should begin screening and how often you should be tested. Additional Information If you have questions, please call [phone]. Remember, ClearContext is NOT to be used for urgent needs. For medical emergencies, dial 911. Educational references and/or instructions provided during this visit included: 
 
Colon Polyps Discharge information has been reviewed with the patient. The patient Colon Polyps: Care Instructions Your Care Instructions Colon polyps are growths in the colon or the rectum. The cause of most colon polyps is not known, and most people who get them do not have any problems. But a certain kind can turn into cancer. For this reason, regular testing for colon polyps is important for people age 48 and older and anyone who has an increased risk for colon cancer. Polyps are usually found through routine colon cancer screening tests. Although most colon polyps are not cancerous, they are usually removed and then tested for cancer. Screening for colon cancer saves lives because the cancer can usually be cured if it is caught early. If you have a polyp that is the type that can turn into cancer, you may need more tests to examine your entire colon. The doctor will remove any other polyps that he or she finds, and you will be tested more often. Follow-up care is a key part of your treatment and safety.  Be sure to make and go to all appointments, and call your doctor if you are having problems. It's also a good idea to know your test results and keep a list of the medicines you take. How can you care for yourself at home? Regular exams to look for colon polyps are the best way to prevent polyps from turning into colon cancer. These can include stool tests, sigmoidoscopy, colonoscopy, and CT colonography. Talk with your doctor about a testing schedule that is right for you. To prevent polyps There is no home treatment that can prevent colon polyps. But these steps may help lower your risk for cancer. · Stay active. Being active can help you get to and stay at a healthy weight. Try to exercise on most days of the week. Walking is a good choice. · Eat well. Choose a variety of vegetables, fruits, legumes (such as peas and beans), fish, poultry, and whole grains. · Do not smoke. If you need help quitting, talk to your doctor about stop-smoking programs and medicines. These can increase your chances of quitting for good. · If you drink alcohol, limit how much you drink. Limit alcohol to 2 drinks a day for men and 1 drink a day for women. When should you call for help? Call your doctor now or seek immediate medical care if: 
· You have severe belly pain. · Your stools are maroon or very bloody. Watch closely for changes in your health, and be sure to contact your doctor if: 
· You have a fever. · You have nausea or vomiting. · You have a change in bowel habits (new constipation or diarrhea). · Your symptoms get worse or are not improving as expected. Where can you learn more? Go to http://richy-debbi.info/. Enter 95 [id number] in the search box to learn more about \"Colon Polyps: Care Instructions. \" Current as of: May 5, 2017 Content Version: 11.3 © 0948-8931 [employer]ogome. Care instructions adapted under license by [employer] (which disclaims liability or warranty for this information).  If you have questions about a medical condition or this instruction, always ask your healthcare professional. [address] [address] any warranty or liability for your use of this information. verbalized understanding. Discharge Orders None Introducing [facility]! [facility] introduces Mzinga patient portal. Now you can access parts of your medical record, email your doctor's office, and request medication refills online. 1. In your internet browser, go to [email or web address] 2. Click on the First Time User? Click Here link in the Sign In box. You will see the New Member Sign Up page. 3. Enter your Mzinga Access Code exactly as it appears below. You will not need to use this code after youve completed the sign-up process. If you do not sign up before the expiration date, you must request a new code. · Mzinga Access Code: [email or web address] Expires: 10/3/2017 10:28 AM 
 
4. Enter the last four digits of your Social Security Number (xxxx) and Date of Birth (mm/dd/yyyy) as indicated and click Submit. You will be taken to the next sign-up page. 5. Create a Mzinga ID. This will be your Mzinga login ID and cannot be changed, so think of one that is secure and easy to remember. 6. Create a Mzinga password. You can change your password at any time. 7. Enter your Password Reset Question and Answer. This can be used at a later time if you forget your password. 8. Enter your e-mail address. You will receive e-mail notification when new information is available in [address] 9. Click Sign Up. You can now view and download portions of your medical record. 10. Click the Download Summary menu link to download a portable copy of your medical information. If you have questions, please visit the Frequently Asked Questions section of the Mzinga website. Remember, Mzinga is NOT to be used for urgent needs. For medical emergencies, dial 911. Now available from your iPhone and Android! General Information Please provide this summary of care documentation to your next provider. Patient Signature:  ____________________________________________________________ Date:  ____________________________________________________________  
  
Edrie Gunnels Provider Signature:  ____________________________________________________________ Date:  ____________________________________________________________

## 2017-07-06 NOTE — ANESTHESIA PREPROCEDURE EVALUATION
Anesthetic History   No history of anesthetic complications            Review of Systems / Medical History  Patient summary reviewed and pertinent labs reviewed    Pulmonary          Smoker         Neuro/Psych   Within defined limits           Cardiovascular    Hypertension          CAD    Exercise tolerance: >4 METS     GI/Hepatic/Renal     GERD           Endo/Other             Other Findings   Comments:   Risk Factors for Postoperative nausea/vomiting:       History of postoperative nausea/vomiting? NO       Female? NO       Motion sickness? NO       Intended opioid administration for postoperative analgesia? NO      Smoking Abstinence  Current Smoker? YES  Elective Surgery? YES  Seen preoperatively by anesthesiologist or proxy prior to day of surgery? YES  Pt abstained from smoking 24 hours prior to anesthesia?  NO               Physical Exam    Airway  Mallampati: II  TM Distance: 4 - 6 cm  Neck ROM: normal range of motion   Mouth opening: Normal     Cardiovascular    Rhythm: regular  Rate: normal         Dental  No notable dental hx       Pulmonary  Breath sounds clear to auscultation               Abdominal  GI exam deferred       Other Findings            Anesthetic Plan    ASA: 3  Anesthesia type: MAC          Induction: Intravenous  Anesthetic plan and risks discussed with: Patient

## 2017-07-06 NOTE — ANESTHESIA POSTPROCEDURE EVALUATION
Post-Anesthesia Evaluation and Assessment    Patient: Raquel Pascual MRN: [id number]  SSN: [id number]    YOB: 1950  Age: 77 y.o. Sex: male       Cardiovascular Function/Vital Signs  Visit Vitals    /70    Pulse 63    Temp 36.9 °C (98.4 °F)    Resp (!) 34    Ht 6' 3\" (1.905 m)    Wt 99.8 kg (220 lb)    SpO2 100%    BMI 27.5 kg/m2       Patient is status post MAC anesthesia for Procedure(s):  COLONOSCOPY / polypectomy . Nausea/Vomiting: None    Postoperative hydration reviewed and adequate. Pain:  Pain Scale 1: Numeric (0 - 10) (07/06/17 1116)  Pain Intensity 1: 0 (07/06/17 1116)   Managed    Neurological Status: At baseline    Mental Status and Level of Consciousness: Arousable    Pulmonary Status:   O2 Device: Room air (07/06/17 1116)   Adequate oxygenation and airway patent    Complications related to anesthesia: None    Post-anesthesia assessment completed.  No concerns    Signed By: Arlette Santos MD     July 6, 2017

## 2017-08-24 ENCOUNTER — OFFICE VISIT (OUTPATIENT)
Dept: CARDIOLOGY CLINIC | Age: 67
End: 2017-08-24

## 2017-08-24 VITALS
WEIGHT: 218 LBS | HEIGHT: 75 IN | HEART RATE: 56 BPM | SYSTOLIC BLOOD PRESSURE: 128 MMHG | DIASTOLIC BLOOD PRESSURE: 80 MMHG | OXYGEN SATURATION: 98 % | BODY MASS INDEX: 27.1 KG/M2

## 2017-08-24 DIAGNOSIS — E78.00 PURE HYPERCHOLESTEROLEMIA: ICD-10-CM

## 2017-08-24 DIAGNOSIS — I25.10 CORONARY ARTERY DISEASE INVOLVING NATIVE CORONARY ARTERY OF NATIVE HEART WITHOUT ANGINA PECTORIS: Primary | ICD-10-CM

## 2017-08-24 DIAGNOSIS — I11.9 BENIGN HYPERTENSIVE HEART DISEASE WITHOUT HEART FAILURE: ICD-10-CM

## 2017-08-24 NOTE — PATIENT INSTRUCTIONS
Continue current medications.    If you have any further questions or concerns, please contact our office. [phone]

## 2017-08-24 NOTE — PROGRESS NOTES
PATIENT NAME: Malcom Smith         79 y.o.      1950              DATE:8/24/2017    REASON FOR VISIT: Hypertension. Coronary artery disease    HISTORY OF PRESENT ILLNESS: History of coronary artery disease, nonobstructive on cath in 2007. Hypertension. Blood pressures have been well controlled. Hyperlipidemia. Followed by PCP. Apparently well controlled. Denies chest pain and other symptoms suggestive of angina. Denies BAXTER, PND, orthopnea. Denies palpitation, syncope, presyncope. Denies edema in the lower extremities. PAST MEDICAL HISTORY:   Past Medical History:  No date: Bilateral cataracts      Comment: removed in 2005  No date: CAD (coronary artery disease), native coronary*      Comment: mild by invasive eval (oct 2007)  No date: Calculus of kidney  No date: ED (erectile dysfunction)  No date: GERD (gastroesophageal reflux disease)  No date: Gross hematuria  No date: Hypercholesterolemia  No date: Hypertension  No date: Hypertension  No date: Incomplete bladder emptying  No date: Nephrolithiasis  06/15/2010: Normal nuclear stress test      Comment: No ischemia or infarction. EF 64%. Ex time                13:30. No date: Peyronie's disease  10/05/2007: S/P cardiac cath      Comment: LM patent. pLAD <20% plaquing. oLCX 30%. RCA patent. LVEDP 20.  EF 60%. Mild arterial                hypertension.   No date: Tobacco use disorder      Comment: ongoing (July 2011)  No date: Urinary retention    PAST SURGICAL HISTORY:   Past Surgical History:  7/6/2017: COLONOSCOPY N/A      Comment: COLONOSCOPY / polypectomy  performed by Candice [facility]alera [facility] at [facility] END[facility]COPY  No date: HX CATARACT REMOVAL Bilateral  No date: HX CHOLECYSTECTOMY  No date: HX HEENT      Comment: retina repair  No date: HX HERNIA REPAIR Left      Comment: inguinal  No date: HX SHOULDER ARTHROSCOPY Right  8/18/2014: HX UROLOGICAL      Comment: [facility], Dr. Gerry Orr Prostatectomy      SOCIAL HISTORY:  Social History    Marital status:              Spouse name:                       Years of education:                 Number of children:               Social History Main Topics    Smoking status: Current Every Day Smoker                                                     Packs/day: 1.00      Years: 25.00       Smokeless status: Never Used                        Comment: less than one PPD; Alcohol use: Yes                Comment: occasionally drinks wine    Drug use: No                ALLERGIES:   No Known Allergies     CURRENT MEDICATIONS:   Current Outpatient Prescriptions:  amLODIPine (NORVASC) 10 mg tablet, Take 1 Tab by mouth daily. Indications: hypertension  CYANOCOBALAMIN, VITAMIN B-12, (VITAMIN B-12 PO), Take  by mouth. atorvastatin (LIPITOR) 40 mg tablet, TAKE 1 TAB BY MOUTH EVERY NIGHT AT BEDTIME. CIALIS 5 mg tablet, Take 1 Tab by mouth daily. For BPH. Indications: SYMPTOMATIC BENIGN PROSTATIC HYPERPLASIA  dutasteride (AVODART) 0.5 mg capsule, Take 1 Cap by mouth Every Friday. cholecalciferol (VITAMIN D3) 400 unit tab tablet, Take 400 Units by mouth daily. ascorbic acid (VITAMIN C) 250 mg tablet, Take 250 mg by mouth daily. aspirin 81 mg tablet, Take 81 mg by mouth daily. VITAMIN E ACETATE (VITAMIN E PO), Take  by mouth daily. ramipril (ALTACE) 10 mg capsule, Take 10 mg by mouth daily. No current facility-administered medications for this visit.        REVIEW of SYSTEMS:Cardiovascular ROS: See history of present illness     PHYSICAL EXAMINATION:   /80  Pulse (!) 56  Ht 6' 3\" (1.905 m)  Wt 98.9 kg (218 lb)  SpO2 98%  BMI 27.25 kg/m2  BP Readings from Last 3 Encounters:  08/24/17 : 128/80  07/06/17 : 107/70  02/23/17 : (!) 130/100    Pulse Readings from Last 3 Encounters:  08/24/17 : (!) 56  07/06/17 : 63  02/23/17 : (!) 59    Wt Readings from Last 3 Encounters:  08/24/17 : 98.9 kg (218 lb)  07/05/17 : 99.8 kg (220 lb)  02/23/17 : 105.7 kg (233 lb)    HEENT: Sclera clear. Mucous membranes pink and moist.  Neck: No jugular venous distention. Carotid upstrokes 2+ without bruits. Chest: Clear to  auscultation. Heart: PMI not palpable. Regular rhythm. No murmur or gallop. Abdomen: Nontender without masses or organomegaly. Extremities: No edema. Skin: Warm and dry. No stasis changes. Neuro: Alert, oriented, speech WNL, no facial asymmetry. Gait WNL. IMPRESSION:   Hypertension, well controlled  Coronary artery disease, nonobstructive on cath in 2007. Asymptomatic    PLAN: No change in medications  Return in 6 months    The diagnoses and plan were discussed with patient. All questions answered. Plan of care agreed to by all concerned. Singh Goldberg.  MD Sanya       ,

## 2017-08-24 NOTE — MR AVS SNAPSHOT
Visit Information Date & Time Provider Department Dept. Phone Encounter #  
 8/24/2017  8:00 AM Ricke Cowden, MD [facility]  Your Appointments 9/5/2017  9:50 AM  
Nurse Visit with [facility] NURSE [facility] ([address]) Appt Note: bw  
 [address] 
[address] [place] [address]  
[address] [facility] [address] [place] [address] 9/13/2017 10:30 AM  
Any with Isadora Cabello MD  
[facility] ([address]) Appt Note: ï ½ Return in about 1 year (around 9/14/2017) for PSA prior . ï ½ Return in about 1 year (around 9/14/2017) for PSA prior . ï ½ Return in about 1 year (around 9/14/2017) for PSA prior . [address] [place] [address]  
[address] [facility] [address] [place] [address] Upcoming Health Maintenance Date Due Hepatitis C Screening 1950 DTaP/Tdap/Td series (1 - Tdap) 8/1/1971 FOBT Q 1 YEAR AGE 50-75 8/1/2000 ZOSTER VACCINE AGE 60> 6/1/2010 GLAUCOMA SCREENING Q2Y 8/1/2015 Pneumococcal 65+ Low/Medium Risk (1 of 2 - PCV13) 8/1/2015 MEDICARE YEARLY EXAM 8/1/2015 INFLUENZA AGE 9 TO ADULT 8/1/2017 Allergies as of 8/24/2017  Review Complete On: 7/6/2017 By: Diane Argueta RN No Known Allergies Current Immunizations  Never Reviewed No immunizations on file. Not reviewed this visit You Were Diagnosed With   
  
 Codes Comments Coronary artery disease involving native coronary artery of native heart without angina pectoris    -  Primary ICD-10-CM: I25.10 ICD-9-CM: 414.01 Benign hypertensive heart disease without heart failure     ICD-10-CM: I11.9 ICD-9-CM: 402.10 Pure hypercholesterolemia     ICD-10-CM: E78.00 ICD-9-CM: 272.0 Vitals BP Pulse Height(growth percentile) Weight(growth percentile) SpO2 BMI 128/80 (!) 56 6' 3\" (1.905 m) 218 lb (98.9 kg) 98% 27.25 kg/m2 Smoking Status Current Every Day Smoker Vitals History BMI and BSA Data Body Mass Index Body Surface Area  
 27.25 kg/m 2 2.29 m 2 Preferred Pharmacy Pharmacy Name Phone [id number] [address] [place] [address] Your Updated Medication List  
  
   
This list is accurate as of: 8/24/17  8:39 AM.  Always use your most recent med list. ALTACE 10 mg capsule Generic drug:  ramipril Take 10 mg by mouth daily. amLODIPine 10 mg tablet Commonly known as:  Krisys Pall Take 1 Tab by mouth daily. Indications: hypertension  
  
 aspirin 81 mg tablet Take 81 mg by mouth daily. atorvastatin 40 mg tablet Commonly known as:  LIPITOR  
TAKE 1 TAB BY MOUTH EVERY NIGHT AT BEDTIME.  
  
 cholecalciferol 400 unit Tab tablet Commonly known as:  VITAMIN D3 Take 400 Units by mouth daily. CIALIS 5 mg tablet Generic drug:  tadalafil Take 1 Tab by mouth daily. For BPH. Indications: SYMPTOMATIC BENIGN PROSTATIC HYPERPLASIA  
  
 dutasteride 0.5 mg capsule Commonly known as:  AVODART Take 1 Cap by mouth Every Friday. VITAMIN B-12 PO Take  by mouth. VITAMIN C 250 mg tablet Generic drug:  ascorbic acid (vitamin C) Take 250 mg by mouth daily. VITAMIN E PO Take  by mouth daily. We Performed the Following AMB POC EKG ROUTINE W/ 12 LEADS, INTER & REP [30220 CPT(R)] Patient Instructions Continue current medications. If you have any further questions or concerns, please contact our office. [phone] Introducing [facility]! [facility] introduces [employer] patient portal. Now you can access parts of your medical record, email your doctor's office, and request medication refills online. 1. In your internet browser, go to [email or web address] 2. Click on the First Time User? Click Here link in the Sign In box. You will see the New Member Sign Up page. 3. Enter your eMazeMe Access Code exactly as it appears below. You will not need to use this code after youve completed the sign-up process. If you do not sign up before the expiration date, you must request a new code. · [employer]MazeMe Access Code: [email or web address] Expires: 10/3/2017 10:28 AM 
 
4. Enter the last four digits of your Social Security Number (xxxx) and Date of Birth (mm/dd/yyyy) as indicated and click Submit. You will be taken to the next sign-up page. 5. Create a eMazeMe ID. This will be your eMazeMe login ID and cannot be changed, so think of one that is secure and easy to remember. 6. Create a eMazeMe password. You can change your password at any time. 7. Enter your Password Reset Question and Answer. This can be used at a later time if you forget your password. 8. Enter your e-mail address. You will receive e-mail notification when new information is available in [address] 9. Click Sign Up. You can now view and download portions of your medical record. 10. Click the Download Summary menu link to download a portable copy of your medical information. If you have questions, please visit the Frequently Asked Questions section of the eMazeMe website. Remember, eMazeMe is NOT to be used for urgent needs. For medical emergencies, dial 911. Now available from your iPhone and Android! Please provide this summary of care documentation to your next provider. If you have any questions after today's visit, please call [phone].

## 2017-08-24 NOTE — PROGRESS NOTES
1. Have you been to the ER, urgent care clinic since your last visit? Hospitalized since your last visit? No    2. Have you seen or consulted any other health care providers outside of the [address] [place] [address] since your last visit? Include any pap smears or colon screening.  No

## 2018-08-08 ENCOUNTER — OFFICE VISIT (OUTPATIENT)
Dept: CARDIOLOGY CLINIC | Age: 68
End: 2018-08-08

## 2018-08-08 VITALS
HEART RATE: 60 BPM | HEIGHT: 75 IN | BODY MASS INDEX: 27.73 KG/M2 | DIASTOLIC BLOOD PRESSURE: 80 MMHG | WEIGHT: 223 LBS | OXYGEN SATURATION: 98 % | SYSTOLIC BLOOD PRESSURE: 120 MMHG

## 2018-08-08 DIAGNOSIS — I25.10 CORONARY ARTERY DISEASE INVOLVING NATIVE CORONARY ARTERY OF NATIVE HEART WITHOUT ANGINA PECTORIS: Primary | ICD-10-CM

## 2018-08-08 DIAGNOSIS — E78.00 PURE HYPERCHOLESTEROLEMIA: ICD-10-CM

## 2018-08-08 DIAGNOSIS — I11.9 BENIGN HYPERTENSIVE HEART DISEASE WITHOUT HEART FAILURE: ICD-10-CM

## 2018-08-08 NOTE — PROGRESS NOTES
1. Have you been to the ER, urgent care clinic since your last visit? Hospitalized since your last visit? No     2. Have you seen or consulted any other health care providers outside of the [facility] since your last visit? Include any pap smears or colon screening.  No

## 2018-08-08 NOTE — PROGRESS NOTES
HISTORY OF PRESENT ILLNESS  Siomara Gandhi is a 76 y.o. male. HPI    Patient presents for a follow-up office visit. He has a past medical history significant for mild nonobstructive coronary disease assessed on cardiac catheterization back in 2007. He last underwent a normal exercise stress echocardiogram in December 2015. He also has a history of essential hypertension and dyslipidemia which has been well controlled on medical therapy. Patient was previously seen by Shell Magnaa and then Sanya. He was last seen in our office almost a year ago. Since last visit he reports his been feeling well. No major change in his activity tolerance. He tries to exercise several days a week and has not noted any major change in his activity level. No exertional chest pain or shortness of breath, no leg swelling, orthopnea or PND. No new palpitations, dizziness or syncope. He did have a history of peripheral neuropathy which appears to have improved. Past Medical History:   Diagnosis Date    Bilateral cataracts     removed in 2005    CAD (coronary artery disease), native coronary artery     mild by invasive eval (oct 2007)    Calculus of kidney     ED (erectile dysfunction)     GERD (gastroesophageal reflux disease)     Gross hematuria     Hypercholesterolemia     Hypertension     Hypertension     Incomplete bladder emptying     Nephrolithiasis     Normal cardiac stress test 12/2015    Normal stress echocardiogram.  Total exercise time 12 minutes, EF 60%, no regional wall motion abnormalities.  Normal nuclear stress test 06/15/2010    No ischemia or infarction. EF 64%. Ex time 13:30.    Peyronie's disease     S/P cardiac cath 10/05/2007    LM patent. pLAD <20% plaquing. oLCX 30%. RCA patent. LVEDP 20.  EF 60%. Mild arterial hypertension.     Tobacco use disorder     ongoing (July 2011)    Urinary retention      Current Outpatient Prescriptions   Medication Sig Dispense Refill    dutasteride (AVODART) 0.5 mg capsule Take 1 Cap by mouth Every Friday. 30 Cap 2    amLODIPine (NORVASC) 10 mg tablet Take 1 Tab by mouth daily. Indications: hypertension 90 Tab 3    CYANOCOBALAMIN, VITAMIN B-12, (VITAMIN B-12 PO) Take  by mouth.  atorvastatin (LIPITOR) 40 mg tablet TAKE 1 TAB BY MOUTH EVERY NIGHT AT BEDTIME. 5    cholecalciferol (VITAMIN D3) 400 unit tab tablet Take 400 Units by mouth daily.  ascorbic acid (VITAMIN C) 250 mg tablet Take 250 mg by mouth daily.  aspirin 81 mg tablet Take 81 mg by mouth daily.  VITAMIN E ACETATE (VITAMIN E PO) Take  by mouth daily.  ramipril (ALTACE) 10 mg capsule Take 10 mg by mouth daily. No Known Allergies     Social History   Substance Use Topics    Smoking status: Current Every Day Smoker     Packs/day: 1.00     Years: 25.00    Smokeless tobacco: Never Used      Comment: less than one PPD;     Alcohol use Yes      Comment: occasionally drinks wine         Review of Systems   Constitutional: Negative for chills, fever and weight loss. HENT: Negative for nosebleeds. Eyes: Negative for blurred vision and double vision. Respiratory: Negative for cough, shortness of breath and wheezing. Cardiovascular: Negative for chest pain, palpitations, orthopnea, claudication, leg swelling and PND. Gastrointestinal: Negative for abdominal pain, heartburn, nausea and vomiting. Genitourinary: Negative for dysuria and hematuria. Musculoskeletal: Negative for falls and myalgias. Skin: Negative for rash. Neurological: Negative for dizziness, focal weakness and headaches. Endo/Heme/Allergies: Does not bruise/bleed easily. Psychiatric/Behavioral: Negative for substance abuse. Visit Vitals    /80    Pulse 60    Ht 6' 3\" (1.905 m)    Wt 101.2 kg (223 lb)    SpO2 98%    BMI 27.87 kg/m2       Physical Exam   Constitutional: He is oriented to person, place, and time. He appears well-developed and well-nourished. HENT:   Head: Normocephalic and atraumatic. Eyes: Conjunctivae are normal.   Neck: Neck supple. No JVD present. Carotid bruit is not present. Cardiovascular: Normal rate, regular rhythm, S1 normal, S2 normal and normal pulses. Exam reveals no gallop and no S3. No murmur heard. Pulmonary/Chest: Breath sounds normal. He has no wheezes. He has no rales. Abdominal: Soft. Bowel sounds are normal. There is no tenderness. Musculoskeletal: He exhibits no edema. Neurological: He is alert and oriented to person, place, and time. Skin: Skin is warm and dry. EKG: Normal sinus rhythm, normal axis, borderline low voltage, poor R-wave progression, no ST-T wave changes. Normal QTc interval.  No change compared to his previous EKG. ASSESSMENT and PLAN    Nonobstructive coronary disease. This was discovered on cardiac catheterization back in 2007. He last underwent a normal stress test in December 2015. No new symptoms concerning for angina. He remains on an aspirin a statin, both of which I would continue. Essential hypertension. Patient blood pressure is well controlled on his current medical regimen which includes amlodipine and ramipril, both of which I would continue. Dyslipidemia. Patient has been treated with atorvastatin 40 mg daily (followed by his PCP. He states his lipids have been well controlled on this regimen. Tobacco use disorder. Patient continues to smoke a pack of cigarettes a day. He is strongly advised to quit smoking completely.     Follow-up annually, sooner if needed

## 2018-08-08 NOTE — MR AVS SNAPSHOT
[address] [address] [address] [address] [phone] Patient: Simran Gordon. MRN: [id number] Visit Information Date & Time Provider Department Dept. Phone Encounter #  
 8/8/2018  9:40 AM Andrew Srinivasan MD [facility] [phone] Your Appointments 11/27/2018  9:10 AM  
Nurse Visit with Allyssa Brown [facility] ([address] [facility]) Appt Note: 1 yr f/u w/ f/t psa prior; 10/26/2017- appt. [employer]. DC; PSA 1 month prior. [address] [facility] [address] [facility] [address]  
  
   
 [address] [facility]  
  
    
  
 12/27/2018 10:30 AM  
Any with Stephanie Friedman MD  
[facility] [address] [place] [address]) Appt Note: 1 yr f/u w/ f/t psa prior; 10/26/2017- appt. [employer] mailed. DC; Return in about 6 months (around 12/18/2018) for follow up, PSA 1 month prior. [address] [facility] [address]  
[phone]  
  
   
 [place] [id number] Upcoming Health Maintenance Date Due Hepatitis C Screening 1950 DTaP/Tdap/Td series (1 - Tdap) 8/1/1971 FOBT Q 1 YEAR AGE 50-75 8/1/2000 ZOSTER VACCINE AGE 60> 6/1/2010 GLAUCOMA SCREENING Q2Y 8/1/2015 Pneumococcal 65+ Low/Medium Risk (1 of 2 - PCV13) 8/1/2015 MEDICARE YEARLY EXAM 3/14/2018 Influenza Age 5 to Adult 8/1/2018 Allergies as of 8/8/2018  Review Complete On: 6/18/2018 By: Stephanie Friedman MD  
 No Known Allergies Current Immunizations  Never Reviewed No immunizations on file. Not reviewed this visit You Were Diagnosed With   
  
 Codes Comments Coronary artery disease involving native coronary artery of native heart without angina pectoris    -  Primary ICD-10-CM: I25.10 ICD-9-CM: 414.01   
 Benign hypertensive heart disease without heart failure     ICD-10-CM: I11.9 ICD-9-CM: 402.10 Pure hypercholesterolemia     ICD-10-CM: E78.00 ICD-9-CM: 272.0 Vitals BP Pulse Height(growth percentile) Weight(growth percentile) SpO2 BMI  
 120/80 60 6' 3\" (1.905 m) 223 lb (101.2 kg) 98% 27.87 kg/m2 Smoking Status Current Every Day Smoker Vitals History BMI and BSA Data Body Mass Index Body Surface Area  
 27.87 kg/m 2 2.31 m 2 Preferred Pharmacy Pharmacy Name Phone [id number] [address] [place] [address] Your Updated Medication List  
  
   
This list is accurate as of 8/8/18 10:34 AM.  Always use your most recent med list. ALTACE 10 mg capsule Generic drug:  ramipril Take 10 mg by mouth daily. amLODIPine 10 mg tablet Commonly known as:  Tilden Roverto Take 1 Tab by mouth daily. Indications: hypertension  
  
 aspirin 81 mg tablet Take 81 mg by mouth daily. atorvastatin 40 mg tablet Commonly known as:  LIPITOR  
TAKE 1 TAB BY MOUTH EVERY NIGHT AT BEDTIME.  
  
 cholecalciferol 400 unit Tab tablet Commonly known as:  VITAMIN D3 Take 400 Units by mouth daily. dutasteride 0.5 mg capsule Commonly known as:  AVODART Take 1 Cap by mouth Every Friday. VITAMIN B-12 PO Take  by mouth. VITAMIN C 250 mg tablet Generic drug:  ascorbic acid (vitamin C) Take 250 mg by mouth daily. VITAMIN E PO Take  by mouth daily. We Performed the Following AMB POC EKG ROUTINE W/ 12 LEADS, INTER & REP [91005 CPT(R)] Please provide this summary of care documentation to your next provider. Your primary care clinician is listed as Terrance Pappas. If you have any questions after today's visit, please call [phone].

## 2019-01-14 ENCOUNTER — OFFICE VISIT (OUTPATIENT)
Dept: CARDIOLOGY CLINIC | Age: 69
End: 2019-01-14

## 2019-01-14 VITALS
HEART RATE: 79 BPM | WEIGHT: 226 LBS | DIASTOLIC BLOOD PRESSURE: 74 MMHG | HEIGHT: 75 IN | BODY MASS INDEX: 28.1 KG/M2 | SYSTOLIC BLOOD PRESSURE: 122 MMHG | OXYGEN SATURATION: 96 %

## 2019-01-14 DIAGNOSIS — R53.83 FATIGUE, UNSPECIFIED TYPE: ICD-10-CM

## 2019-01-14 DIAGNOSIS — R06.02 SHORTNESS OF BREATH: ICD-10-CM

## 2019-01-14 DIAGNOSIS — I25.10 CORONARY ARTERY DISEASE INVOLVING NATIVE CORONARY ARTERY OF NATIVE HEART WITHOUT ANGINA PECTORIS: Primary | ICD-10-CM

## 2019-01-14 DIAGNOSIS — R07.9 CHEST PAIN, UNSPECIFIED TYPE: ICD-10-CM

## 2019-01-14 NOTE — PROGRESS NOTES
Dacia Parada. presents today for evaluation of complaints of fairly new onset dyspnea on exertion, fatigue, chest discomfort with exertion, as well as \"indigestion-like\" discomfort that radiates from the lower end of the sternum to the base of his throat. He noticed some of the symptoms while running on the treadmill at the gym over the past 2-3 weeks. He states that he has not had any indigestion issues since his gallbladder was removed some time ago so the return of the \"indigestion\" symptoms concerned him. He is a 76year old male with history of hypertension, dyslipidemia, tobacco use, and mild non-obstructive CAD noted on cardiac catheterization in 2007. He underwent a stress echo in December 2015 which was normal.  He also states that he has been evaluated for PVD and he saw a neurologist who told him that he had neuropathy. He was last seen by Dr Jeff Alarcon in August 2018 and during that visit, he was doing well and did not offer any symptoms concerning for angina. Admits to chest \"discomfort\" but denies it being pain per se. Denies chest tightness, heaviness, and palpitations. Denies shortness of breath at rest, admits to dyspnea on exertion, denies orthopnea and PND. Denies abdominal bloating. Denies lightheadedness, dizziness, and syncope. Admits to new onset occasional ankle edema edema and denies claudication. Denies nausea, vomiting, diarrhea, melena, hematochezia. Denies hematuria, urgency, frequency. Denies fever, chills. PMH: 
Past Medical History:  
Diagnosis Date  Bilateral cataracts   
 removed in 2005  CAD (coronary artery disease), native coronary artery   
 mild by invasive eval (oct 2007)  Calculus of kidney  ED (erectile dysfunction)  GERD (gastroesophageal reflux disease)  Gross hematuria  Hypercholesterolemia  Hypertension  Hypertension  Incomplete bladder emptying  Nephrolithiasis  Normal cardiac stress test 12/2015 Normal stress echocardiogram.  Total exercise time 12 minutes, EF 60%, no regional wall motion abnormalities.  Normal nuclear stress test 06/15/2010 No ischemia or infarction. EF 64%. Ex time 13:30.  
 Peyronie's disease  S/P cardiac cath 10/05/2007 LM patent. pLAD <20% plaquing. oLCX 30%. RCA patent. LVEDP 20.  EF 60%. Mild arterial hypertension.  Tobacco use disorder   
 ongoing (July 2011)  Urinary retention PSH: 
Past Surgical History:  
Procedure Laterality Date  COLONOSCOPY N/A 7/6/2017 COLONOSCOPY / polypectomy  performed by Ken Pathak MD at [address] HX CATARACT REMOVAL Bilateral   
 HX CHOLECYSTECTOMY  HX HEENT    
 retina repair  HX HERNIA REPAIR Left   
 inguinal  
 HX SHOULDER ARTHROSCOPY Right  HX UROLOGICAL  8/18/2014 [facility], Dr. Shelly Valdovinos Prostatectomy MEDS: 
Current Outpatient Medications Medication Sig  dutasteride (AVODART) 0.5 mg capsule TAKE 1 CAPSULE BY MOUTH EVERY FRIDAY  amLODIPine (NORVASC) 10 mg tablet Take 1 Tab by mouth daily. Indications: hypertension  CYANOCOBALAMIN, VITAMIN B-12, (VITAMIN B-12 PO) Take  by mouth.  atorvastatin (LIPITOR) 40 mg tablet TAKE 1 TAB BY MOUTH EVERY NIGHT AT BEDTIME.  cholecalciferol (VITAMIN D3) 400 unit tab tablet Take 400 Units by mouth daily.  ascorbic acid (VITAMIN C) 250 mg tablet Take 250 mg by mouth daily.  aspirin 81 mg tablet Take 81 mg by mouth daily.  VITAMIN E ACETATE (VITAMIN E PO) Take  by mouth daily.  ramipril (ALTACE) 10 mg capsule Take 10 mg by mouth daily. No current facility-administered medications for this visit. Allergies and Sensitivities: 
No Known Allergies Family History: 
Family History Problem Relation Age of Onset  Pacemaker Mother  Heart Attack Father Social History: He  reports that he has been smoking.   He has a 25.00 pack-year smoking history. he has never used smokeless tobacco.  He  reports that he drinks alcohol. Physical: 
Visit Vitals /74 Pulse 79 Ht 6' 3\" (1.905 m) Wt 102.5 kg (226 lb) SpO2 96% BMI 28.25 kg/m² Exam: 
Neck:  Supple, no JVD, no carotid bruits CV:  Normal S1 and  S2, no murmurs, rubs, or gallops noted Lungs:  Clear to ausculation throughout, no wheezes or rales Abd:  Soft, non-tender, non-distended with good bowel sounds. No hepatosplenomegaly Extremities:  trace edema around the ankles. Data: 
EKG:  Read by Osiel Carrington, DO - Sinus rhythm.  Poor R-wave progression.  Nonspecific, consider old anterior infarct LABS: 
Lab Results Component Value Date/Time Sodium 140 08/12/2014 11:46 AM  
 Potassium 4.4 08/12/2014 11:46 AM  
 Chloride 103 08/12/2014 11:46 AM  
 CO2 33 (H) 08/12/2014 11:46 AM  
 Glucose 98 08/12/2014 11:46 AM  
 BUN 12 08/12/2014 11:46 AM  
 Creatinine 0.90 08/12/2014 11:46 AM  
 
No results found for: CHOL, CHOLX, CHLST, CHOLV, HDL, LDL, LDLC, DLDLP, TGLX, TRIGL, TRIGP, CHHD, CHHDX Lab Results Component Value Date/Time ALT (SGPT) 22 08/12/2014 11:46 AM  
 
 
 
Impression/Plan: 1. Dyspnea on exertion 2. Fatigue 3. Chest \"discomfort\"/indigestion 4. History of non-obstructive CAD (cath 2007) 5. Essential hypertension, blood pressure controlled. 6.  Dyslipidemia, on atorvastatin 40mg Mr. Lucas Parkinson was seen today for evaluation of fatigue, BAXTER, chest discomfort, and \"indigestion-like\" symptoms that have been occurring over the past 2-3 weeks. He first noticed it while running on the treadmill at the gym. He states that he could run on the treadmill without any problems but over the past 2-3 weeks, he noticed that he would have to slow down or stop as the symptoms would occur. The \"indigestion\" radiates from his lower sternum up to his throat and typically resolves on its own. He has not taken anything to try to relieve it.   He has not experienced any nausea, vomiting, or diaphoresis with the other symptoms. He has noticed some new onset mild, intermittent ankle edema. His blood pressure is well controlled and his EKG show sinus rhythm without any acute EKG changes. His breath sounds are clear and he has trace lower extremity edema around the ankles. His last stress echo was performed in Dec. 2015 which was normal.  Will request that he have an echocardiogram and pharmacologic nuclear stress test done, hopefully this week. He was advised to not exercise at this time. He was also advised to limit his sodium intake. He will follow-up with Dr. Mihaela De Dios as scheduled or sooner if needed. Jonah Acuna MSN, FNP-BC Please note:  Portions of this chart were created with Dragon medical speech to text program.  Unrecognized errors may be present.

## 2019-01-14 NOTE — PATIENT INSTRUCTIONS
Echocardiogram :  DX:  Chest pain, shortness of breath, fatigue Pharmacologic nuclear stress test; Dx: Chest pain, shortness of breath, fatigue Follow-up with Dr. Catina Ortiz as scheduled and as needed

## 2019-01-24 ENCOUNTER — HOSPITAL ENCOUNTER (OUTPATIENT)
Dept: NON INVASIVE DIAGNOSTICS | Age: 69
Discharge: HOME | End: 2019-01-24
Attending: NURSE PRACTITIONER

## 2019-01-24 DIAGNOSIS — R07.9 CHEST PAIN, UNSPECIFIED TYPE: ICD-10-CM

## 2019-01-24 DIAGNOSIS — R06.02 SHORTNESS OF BREATH: ICD-10-CM

## 2019-01-24 DIAGNOSIS — R53.83 FATIGUE, UNSPECIFIED TYPE: ICD-10-CM

## 2019-01-24 NOTE — PROGRESS NOTES
Unable to do nuclear stress test because patient drank coffee the morning of test.  I offered the patient to do his test later in the day (after npo 6 hrs), but he said he would rather reschedule his nuclear stress test and echo for another day.

## 2019-01-29 DIAGNOSIS — I25.10 CORONARY ARTERY DISEASE INVOLVING NATIVE CORONARY ARTERY OF NATIVE HEART WITHOUT ANGINA PECTORIS: Primary | ICD-10-CM

## 2019-01-30 ENCOUNTER — APPOINTMENT (OUTPATIENT)
Dept: NON INVASIVE DIAGNOSTICS | Age: 69
End: 2019-01-30
Attending: NURSE PRACTITIONER
Payer: MEDICARE

## 2019-01-30 ENCOUNTER — HOSPITAL ENCOUNTER (OUTPATIENT)
Dept: NON INVASIVE DIAGNOSTICS | Age: 69
Discharge: HOME | End: 2019-01-30
Attending: NURSE PRACTITIONER
Payer: MEDICARE

## 2019-01-30 VITALS
BODY MASS INDEX: 28.1 KG/M2 | HEIGHT: 75 IN | SYSTOLIC BLOOD PRESSURE: 120 MMHG | DIASTOLIC BLOOD PRESSURE: 80 MMHG | WEIGHT: 226 LBS

## 2019-01-30 VITALS
SYSTOLIC BLOOD PRESSURE: 122 MMHG | DIASTOLIC BLOOD PRESSURE: 74 MMHG | HEIGHT: 75 IN | WEIGHT: 226 LBS | BODY MASS INDEX: 28.1 KG/M2

## 2019-01-30 DIAGNOSIS — I25.10 CORONARY ARTERY DISEASE INVOLVING NATIVE CORONARY ARTERY OF NATIVE HEART WITHOUT ANGINA PECTORIS: ICD-10-CM

## 2019-01-30 LAB
STRESS BASELINE DIAS BP: 80 MMHG
STRESS BASELINE HR: 70 BPM
STRESS BASELINE SYS BP: 120 MMHG
STRESS ESTIMATED WORKLOAD: 1.5 METS
STRESS EXERCISE DUR MIN: NORMAL
STRESS PEAK DIAS BP: 80 MMHG
STRESS PEAK SYS BP: 140 MMHG
STRESS PERCENT HR ACHIEVED: 71 %
STRESS POST PEAK HR: 92 BPM
STRESS RATE PRESSURE PRODUCT: NORMAL BPM*MMHG
STRESS ST DEPRESSION: 0 MM
STRESS ST ELEVATION: 0 MM
STRESS STAGE 1 BP: NORMAL MMHG
STRESS STAGE 1 DURATION: 3 MIN:SEC
STRESS STAGE 1 HR: 92 BPM
STRESS STAGE RECOVERY 1 BP: NORMAL MMHG
STRESS STAGE RECOVERY 1 DURATION: 1 MIN:SEC
STRESS STAGE RECOVERY 1 HR: 84 BPM
STRESS TARGET HR: 129 BPM

## 2019-01-30 PROCEDURE — 74011250636 HC RX REV CODE- 250/636: Performed by: NURSE PRACTITIONER

## 2019-01-30 PROCEDURE — 93306 TTE W/DOPPLER COMPLETE: CPT

## 2019-01-30 PROCEDURE — 93017 CV STRESS TEST TRACING ONLY: CPT

## 2019-01-30 RX ORDER — SODIUM CHLORIDE 0.9 % (FLUSH) 0.9 %
10 SYRINGE (ML) INJECTION AS NEEDED
Status: COMPLETED | OUTPATIENT
Start: 2019-01-30 | End: 2019-01-30

## 2019-01-30 RX ORDER — SODIUM CHLORIDE 9 MG/ML
100 INJECTION, SOLUTION INTRAVENOUS ONCE
Status: COMPLETED | OUTPATIENT
Start: 2019-01-30 | End: 2019-01-30

## 2019-01-30 RX ADMIN — Medication 10 ML: at 08:15

## 2019-01-30 RX ADMIN — REGADENOSON 0.4 MG: 0.08 INJECTION, SOLUTION INTRAVENOUS at 10:00

## 2019-01-30 RX ADMIN — SODIUM CHLORIDE 100 ML/HR: 900 INJECTION, SOLUTION INTRAVENOUS at 10:00

## 2019-01-30 NOTE — PROGRESS NOTES
Patient was injected with 90.2 millicuries 77FMC Sestamibi on 1/30/19 at 0815. Patient was injected with 70.2 millicuries 12YEU Sestamibi on 1/30/19 at 1000. Patient's armbands were removed and placed in shred-it box.  
 
Patient had a Nuclear Lexiscan Stress Test.

## 2019-01-31 LAB
ECHO AO ASC DIAM: 3.64 CM
ECHO AO ROOT DIAM: 4.02 CM
ECHO LA AREA 4C: 16.7 CM2
ECHO LA VOL 2C: 33.9 ML (ref 18–58)
ECHO LA VOL 4C: 44.84 ML (ref 18–58)
ECHO LA VOL BP: 41.99 ML (ref 18–58)
ECHO LA VOL/BSA BIPLANE: 18.17 ML/M2 (ref 16–28)
ECHO LA VOLUME INDEX A2C: 14.67 ML/M2 (ref 16–28)
ECHO LA VOLUME INDEX A4C: 19.4 ML/M2 (ref 16–28)
ECHO LV E' LATERAL VELOCITY: 9.24 CM/S
ECHO LV E' SEPTAL VELOCITY: 7.68 CM/S
ECHO LV INTERNAL DIMENSION DIASTOLIC: 4.64 CM (ref 4.2–5.9)
ECHO LV INTERNAL DIMENSION SYSTOLIC: 2.83 CM
ECHO LV IVSD: 1.05 CM (ref 0.6–1)
ECHO LV MASS 2D: 207.2 G (ref 88–224)
ECHO LV MASS INDEX 2D: 89.6 G/M2 (ref 49–115)
ECHO LV POSTERIOR WALL DIASTOLIC: 1.1 CM (ref 0.6–1)
ECHO LVOT DIAM: 2.32 CM
ECHO LVOT PEAK GRADIENT: 3.4 MMHG
ECHO LVOT PEAK VELOCITY: 92.09 CM/S
ECHO LVOT VTI: 19.16 CM
ECHO MV A VELOCITY: 57.59 CM/S
ECHO MV E DECELERATION TIME (DT): 213.6 MS
ECHO MV E VELOCITY: 0.42 CM/S
ECHO MV E/A RATIO: 0.01
ECHO MV E/E' LATERAL: 0.05
ECHO MV E/E' RATIO (AVERAGED): 0.05
ECHO MV E/E' SEPTAL: 0.05
ECHO PULMONARY ARTERY SYSTOLIC PRESSURE (PASP): 32 MMHG
ECHO RV TAPSE: 2.52 CM (ref 1.5–2)
ECHO TV REGURGITANT MAX VELOCITY: 244.26 CM/S
ECHO TV REGURGITANT PEAK GRADIENT: 23.9 MMHG

## 2019-02-06 NOTE — PROGRESS NOTES
Results of stress test and echocardiogram discussed with Mr. Brice Mckinley. Made aware that EF within normal range, no wall motion abnormalities on echo and myocardial perfusion imaging was negative and considered normal and low risk. He verbalized his understanding of what we discussed. Advised to see PCP for work-up for GERD.

## 2019-10-07 ENCOUNTER — OFFICE VISIT (OUTPATIENT)
Dept: CARDIOLOGY CLINIC | Age: 69
End: 2019-10-07

## 2019-10-07 VITALS
OXYGEN SATURATION: 97 % | WEIGHT: 221 LBS | SYSTOLIC BLOOD PRESSURE: 124 MMHG | BODY MASS INDEX: 27.48 KG/M2 | HEART RATE: 48 BPM | DIASTOLIC BLOOD PRESSURE: 80 MMHG | HEIGHT: 75 IN

## 2019-10-07 DIAGNOSIS — E78.00 PURE HYPERCHOLESTEROLEMIA: ICD-10-CM

## 2019-10-07 DIAGNOSIS — R53.83 FATIGUE, UNSPECIFIED TYPE: ICD-10-CM

## 2019-10-07 DIAGNOSIS — I11.9 BENIGN HYPERTENSIVE HEART DISEASE WITHOUT HEART FAILURE: ICD-10-CM

## 2019-10-07 DIAGNOSIS — R00.1 BRADYCARDIA: ICD-10-CM

## 2019-10-07 DIAGNOSIS — R07.9 CHEST PAIN, UNSPECIFIED TYPE: Primary | ICD-10-CM

## 2019-10-07 NOTE — PROGRESS NOTES
HISTORY OF PRESENT ILLNESS  Armen Bence. is a 71 y.o. male. Hypertension   Associated symptoms include chest pain, headaches and shortness of breath. Pertinent negatives include no abdominal pain. Chest Pain (Angina)    Associated symptoms include headaches and shortness of breath. Pertinent negatives include no abdominal pain, no claudication, no cough, no dizziness, no fever, no nausea, no orthopnea, no palpitations, no PND and no vomiting. Fatigue   Associated symptoms include chest pain, headaches and shortness of breath. Pertinent negatives include no abdominal pain. Patient presents for a follow-up office visit. He has a past medical history significant for mild nonobstructive coronary disease assessed on cardiac catheterization back in 2007. He last underwent a normal exercise stress echocardiogram in December 2015. He also has a history of essential hypertension and dyslipidemia which has been well controlled on medical therapy. Patient was last seen in our office in January 2019 by our nurse practitioner complaining of chest pain and exertional dyspnea. As result he underwent both an echocardiogram and a pharmacologic nuclear stress test, both of which were unremarkable. His nuclear stress test was a low risk, no ischemia or infarction, EF 54%. His echocardiogram did not show any valvular heart disease, preserved LV function with a mildly dilated aortic root. Over the past 8 to 9 months, he has noticed fairly frequent left-sided chest pain which is more common with exertion. He can point to with his finger and describes it as an ache which will last for almost an hour after he finishes his exertional activity. He denies any associated shortness of breath, diaphoresis, nausea or vomiting. He also has been noticing increased headaches.   Lastly, he has been more fatigued over the past 6 months than previous and does not offer easily during the day especially when he is sitting down. He does continue to smoke and is now up to 1 pack of cigarettes a day. Past Medical History:   Diagnosis Date    Bilateral cataracts     removed in 2005    CAD (coronary artery disease), native coronary artery     mild by invasive eval (oct 2007)    Calculus of kidney     ED (erectile dysfunction)     GERD (gastroesophageal reflux disease)     Gross hematuria     Hypercholesterolemia     Hypertension     Hypertension     Incomplete bladder emptying     Nephrolithiasis     Normal cardiac stress test 12/2015    Normal stress echocardiogram.  Total exercise time 12 minutes, EF 60%, no regional wall motion abnormalities.  Normal nuclear stress test 06/15/2010    No ischemia or infarction. EF 64%. Ex time 13:30.    Peyronie's disease     S/P cardiac cath 10/05/2007    LM patent. pLAD <20% plaquing. oLCX 30%. RCA patent. LVEDP 20.  EF 60%. Mild arterial hypertension.  Tobacco use disorder     ongoing (July 2011)    Urinary retention      Current Outpatient Medications   Medication Sig Dispense Refill    dutasteride (AVODART) 0.5 mg capsule TAKE 1 CAPSULE BY MOUTH EVERY FRIDAY 30 Cap 2    amLODIPine (NORVASC) 10 mg tablet Take 1 Tab by mouth daily. Indications: hypertension 90 Tab 3    CYANOCOBALAMIN, VITAMIN B-12, (VITAMIN B-12 PO) Take  by mouth.  atorvastatin (LIPITOR) 40 mg tablet TAKE 1 TAB BY MOUTH EVERY NIGHT AT BEDTIME. 5    cholecalciferol (VITAMIN D3) 400 unit tab tablet Take 400 Units by mouth daily.  ascorbic acid (VITAMIN C) 250 mg tablet Take 250 mg by mouth daily.  aspirin 81 mg tablet Take 81 mg by mouth daily.  VITAMIN E ACETATE (VITAMIN E PO) Take  by mouth daily.  ramipril (ALTACE) 10 mg capsule Take 10 mg by mouth daily.        No Known Allergies     Social History     Tobacco Use    Smoking status: Current Every Day Smoker     Packs/day: 1.00     Years: 25.00     Pack years: 25.00    Smokeless tobacco: Never Used    Tobacco comment: less than one PPD;    Substance Use Topics    Alcohol use: Yes     Comment: occasionally drinks wine    Drug use: No         Review of Systems   Constitutional: Positive for fatigue. Negative for chills, fever and weight loss. HENT: Negative for nosebleeds. Eyes: Negative for blurred vision and double vision. Respiratory: Positive for shortness of breath. Negative for cough and wheezing. Cardiovascular: Positive for chest pain. Negative for palpitations, orthopnea, claudication, leg swelling and PND. Gastrointestinal: Negative for abdominal pain, heartburn, nausea and vomiting. Genitourinary: Negative for dysuria and hematuria. Musculoskeletal: Negative for falls and myalgias. Skin: Negative for rash. Neurological: Positive for headaches. Negative for dizziness and focal weakness. Endo/Heme/Allergies: Does not bruise/bleed easily. Psychiatric/Behavioral: Negative for substance abuse. Visit Vitals  /80 (BP 1 Location: Left arm, BP Patient Position: Sitting)   Pulse (!) 48   Ht 6' 3\" (1.905 m)   Wt 100.2 kg (221 lb)   SpO2 97%   BMI 27.62 kg/m²       Physical Exam   Constitutional: He is oriented to person, place, and time. He appears well-developed and well-nourished. HENT:   Head: Normocephalic and atraumatic. Eyes: Conjunctivae are normal.   Neck: Neck supple. No JVD present. Carotid bruit is not present. Cardiovascular: Regular rhythm, S1 normal, S2 normal and normal pulses. Bradycardia present. Exam reveals no gallop and no S3. No murmur heard. Pulmonary/Chest: Breath sounds normal. He has no wheezes. He has no rales. Abdominal: Soft. Bowel sounds are normal. There is no tenderness. Musculoskeletal: He exhibits no edema. Neurological: He is alert and oriented to person, place, and time. Skin: Skin is warm and dry. EKG: Sinus bradycardia, normal axis, borderline low voltage, poor R-wave progression, no ST-T wave changes.   Normal QTc interval. Compared to previous EKG, heart rate has decreased. ASSESSMENT and PLAN    Exertional chest pain. Somewhat concerning for angina, however, he did undergo a low risk stress test in January 2019 and states his symptoms were present at that time. Since that test was a chemical stress test, I would like to see if he is having symptoms with exercise, so I recommended a regular exercise treadmill test to both assess for any exertional symptoms exertional EKG changes and to assess his chronotropic response to activity. Sinus bradycardia. Patient is complaining of increased fatigue, so he may be symptomatic. I have recommended a treadmill stress test as described above. Would also like to check a thyroid panel. Daytime somnolence. I have recommended that he pursue a sleep study. Nonobstructive coronary disease. This was discovered on cardiac catheterization back in 2007. He last underwent a normal pharmacologic nuclear stress test in January 2019. Since he does have persistent exertional symptoms I have recommended a treadmill stress test as described above. I would have a low threshold for a cardiac catheterization if this is abnormal.    Essential hypertension. Patient blood pressure is well controlled on his current medical regimen which includes amlodipine and ramipril, both of which I would continue. Dyslipidemia. Patient has been treated with atorvastatin 40 mg daily, which has been followed by his PCP. He states his lipids have been well controlled on this regimen. Tobacco use disorder. Patient continues to smoke a pack of cigarettes a day. He is strongly advised to quit smoking completely.         Follow-up in 6 months sooner if needed

## 2019-10-07 NOTE — PROGRESS NOTES
Tyson Tena. presents today for   Chief Complaint   Patient presents with    Hypertension     1 year follow up        Tyson Tena. preferred language for health care discussion is english/other. Is someone accompanying this pt? no    Is the patient using any DME equipment during OV? no    Depression Screening:  No flowsheet data found. Learning Assessment:  Learning Assessment 8/8/2018   PRIMARY LEARNER Patient   CO-LEARNER CAREGIVER -   PRIMARY LANGUAGE ENGLISH   LEARNER PREFERENCE PRIMARY DEMONSTRATION   ANSWERED BY Patient   RELATIONSHIP SELF       Abuse Screening:  No flowsheet data found. Fall Risk  No flowsheet data found. Pt currently taking Anticoagulant therapy? ASA 81mg every day     Coordination of Care:  1. Have you been to the ER, urgent care clinic since your last visit? Hospitalized since your last visit? no    2. Have you seen or consulted any other health care providers outside of the [address] [place] [address] since your last visit? Include any pap smears or colon screening.  no

## 2019-10-07 NOTE — PATIENT INSTRUCTIONS
Treadmill stress test chest pain, bradycardia (acess chronotropic response to exercise) Blood work ( tsh, freeT4) Follow up 6 months If you have not heard from the central scheduler to schedule your testing in 48 hours, please call [phone].

## 2019-10-14 ENCOUNTER — HOSPITAL ENCOUNTER (OUTPATIENT)
Dept: NON INVASIVE DIAGNOSTICS | Age: 69
Discharge: HOME | End: 2019-10-14
Attending: INTERNAL MEDICINE
Payer: MEDICARE

## 2019-10-14 DIAGNOSIS — R07.9 CHEST PAIN, UNSPECIFIED TYPE: ICD-10-CM

## 2019-10-14 DIAGNOSIS — R00.1 BRADYCARDIA: ICD-10-CM

## 2019-10-14 LAB
STRESS ANGINA INDEX: 0
STRESS BASELINE DIAS BP: 88 MMHG
STRESS BASELINE HR: 54 BPM
STRESS BASELINE SYS BP: 126 MMHG
STRESS ESTIMATED WORKLOAD: 12.2 METS
STRESS EXERCISE DUR MIN: NORMAL
STRESS PEAK DIAS BP: 98 MMHG
STRESS PEAK SYS BP: 204 MMHG
STRESS PERCENT HR ACHIEVED: 94 %
STRESS POST PEAK HR: 142 BPM
STRESS RATE PRESSURE PRODUCT: NORMAL BPM*MMHG
STRESS SR DUKE TREADMILL SCORE: 0
STRESS ST DEPRESSION: 0 MM
STRESS ST ELEVATION: 0 MM
STRESS TARGET HR: 151 BPM

## 2019-10-14 PROCEDURE — 93017 CV STRESS TEST TRACING ONLY: CPT

## 2019-10-15 NOTE — PROGRESS NOTES
Per your last note' Exertional chest pain. Somewhat concerning for angina, however, he did undergo a low risk stress test in January 2019 and states his symptoms were present at that time. Since that test was a chemical stress test, I would like to see if he is having symptoms with exercise, so I recommended a regular exercise treadmill test to both assess for any exertional symptoms exertional EKG changes and to assess his chronotropic response to activity.     Sinus bradycardia. Patient is complaining of increased fatigue, so he may be symptomatic. I have recommended a treadmill stress test as described above.   Would also like to check a thyroid panel.

## 2019-10-16 ENCOUNTER — TELEPHONE (OUTPATIENT)
Dept: CARDIOLOGY CLINIC | Age: 69
End: 2019-10-16

## 2019-10-16 NOTE — TELEPHONE ENCOUNTER
----- Message from Franny Manzanares MD sent at 10/15/2019  2:08 PM EDT ----- Please let the patient know that his stress test was negative. He had normal heart rate response to exertion. Good activity tolerance. ----- Message ----- From: Faisal Rogel LPN Sent: 10/15/2019  11:13 AM EDT To: Franny Manzanares MD 
 
Per your last note' Exertional chest pain. Somewhat concerning for angina, however, he did undergo a low risk stress test in January 2019 and states his symptoms were present at that time. Since that test was a chemical stress test, I would like to see if he is having symptoms with exercise, so I recommended a regular exercise treadmill test to both assess for any exertional symptoms exertional EKG changes and to assess his chronotropic response to activity. 
  
Sinus bradycardia. Patient is complaining of increased fatigue, so he may be symptomatic. I have recommended a treadmill stress test as described above.   Would also like to check a thyroid panel.

## 2020-08-31 ENCOUNTER — OFFICE VISIT (OUTPATIENT)
Dept: CARDIOLOGY CLINIC | Age: 70
End: 2020-08-31

## 2020-08-31 VITALS
OXYGEN SATURATION: 97 % | SYSTOLIC BLOOD PRESSURE: 118 MMHG | HEIGHT: 75 IN | WEIGHT: 227 LBS | BODY MASS INDEX: 28.23 KG/M2 | DIASTOLIC BLOOD PRESSURE: 80 MMHG | HEART RATE: 78 BPM

## 2020-08-31 DIAGNOSIS — F17.200 TOBACCO USE DISORDER: ICD-10-CM

## 2020-08-31 DIAGNOSIS — I25.10 CORONARY ARTERY DISEASE INVOLVING NATIVE CORONARY ARTERY OF NATIVE HEART WITHOUT ANGINA PECTORIS: ICD-10-CM

## 2020-08-31 DIAGNOSIS — E78.00 PURE HYPERCHOLESTEROLEMIA: ICD-10-CM

## 2020-08-31 DIAGNOSIS — I11.9 BENIGN HYPERTENSIVE HEART DISEASE WITHOUT HEART FAILURE: Primary | ICD-10-CM

## 2020-08-31 NOTE — PROGRESS NOTES
Minor Lira. presents today for   Chief Complaint   Patient presents with    Follow-up     10 month follow up       Minor Lira. preferred language for health care discussion is english/other. Is someone accompanying this pt? no    Is the patient using any DME equipment during [address]? no    Depression Screening:  3 most recent PHQ Screens 8/31/2020   Little interest or pleasure in doing things Not at all   Feeling down, depressed, irritable, or hopeless Not at all   Total Score PHQ 2 0       Learning Assessment:  Learning Assessment 8/8/2018   PRIMARY LEARNER Patient   CO-LEARNER CAREGIVER -   PRIMARY LANGUAGE ENGLISH   LEARNER PREFERENCE PRIMARY DEMONSTRATION   ANSWERED BY Patient   RELATIONSHIP SELF       Abuse Screening:  Abuse Screening Questionnaire 8/31/2020   Do you ever feel afraid of your partner? N   Are you in a relationship with someone who physically or mentally threatens you? N   Is it safe for you to go home? Y       Fall Risk  Fall Risk Assessment, last 12 mths 8/31/2020   Able to walk? Yes   Fall in past 12 months? No       Pt currently taking Anticoagulant therapy? no    Coordination of Care:  1. Have you been to the ER, urgent care clinic since your last visit? Hospitalized since your last visit? no    2. Have you seen or consulted any other health care providers outside of the [address] [place] [address] since your last visit? Include any pap smears or colon screening.  no

## 2020-08-31 NOTE — PROGRESS NOTES
HISTORY OF PRESENT ILLNESS  Sunday Penn. is a 79 y.o. male. Follow-up   Associated symptoms include shortness of breath. Pertinent negatives include no chest pain, no abdominal pain and no headaches. Patient presents for a follow-up office visit. He has a past medical history significant for mild nonobstructive coronary disease assessed on cardiac catheterization back in 2007. He last underwent a normal exercise stress echocardiogram in December 2015. He also has a history of essential hypertension and dyslipidemia which has been well controlled on medical therapy. In January 2019, he was complaining of chest pain and exertional dyspnea. As result he underwent both an echocardiogram and a pharmacologic nuclear stress test, both of which were unremarkable. His nuclear stress test was a low risk, no ischemia or infarction, EF 54%. His echocardiogram did not show any valvular heart disease, preserved LV function with a mildly dilated aortic root. Patient then underwent a regular treadmill stress test in October 2019 to assess his activity tolerance. He was able to exercise for 9-1/2 minutes using the standard Lawson protocol without any concerning EKG changes or anginal type symptoms. He had appropriate heart rate and blood pressure response to exertion. He was last seen in our office 10-11 months ago. He states he has been feeling a little better since that time. He denies any worsening shortness of breath, no chest pain at rest or with exertion. No major change in his activity tolerance. He is still smoking about a pack of cigarettes a day.     Past Medical History:   Diagnosis Date    Bilateral cataracts     removed in 2005    CAD (coronary artery disease), native coronary artery     mild by invasive eval (oct 2007)    Calculus of kidney     ED (erectile dysfunction)     GERD (gastroesophageal reflux disease)     Gross hematuria     Hypercholesterolemia     Hypertension  Hypertension     Incomplete bladder emptying     Nephrolithiasis     Normal cardiac stress test 12/2015    Normal stress echocardiogram.  Total exercise time 12 minutes, EF 60%, no regional wall motion abnormalities.  Normal nuclear stress test 06/15/2010    No ischemia or infarction. EF 64%. Ex time 13:30.    Peyronie's disease     S/P cardiac cath 10/05/2007    LM patent. pLAD <20% plaquing. oLCX 30%. RCA patent. LVEDP 20.  EF 60%. Mild arterial hypertension.  Tobacco use disorder     ongoing (July 2011)    Urinary retention      Current Outpatient Medications   Medication Sig Dispense Refill    dutasteride (AVODART) 0.5 mg capsule TAKE 1 CAPSULE BY MOUTH EVERY FRIDAY 12 Cap 7    tri mix compound DISPENSE COMPOUNDEDTRI-MIX (PGE 1 25 mcg/  Papaverine 30 mg/  Phentolamine 1 mg/). USE AS DIRECTED. 10 mL 5    Syringe with Needle, Disp, (ALLERGY SYRINGE) 1 mL 27 x 1/2\" syrg FOR USE WITH INTRACAVERNOSAL INJECTIONS. 25 Pen Needle 2    amLODIPine (NORVASC) 10 mg tablet Take 1 Tab by mouth daily. Indications: hypertension 90 Tab 3    atorvastatin (LIPITOR) 40 mg tablet TAKE 1 TAB BY MOUTH EVERY NIGHT AT BEDTIME. 5    cholecalciferol (VITAMIN D3) 400 unit tab tablet Take 400 Units by mouth daily.  ascorbic acid (VITAMIN C) 250 mg tablet Take 250 mg by mouth daily.  ramipril (ALTACE) 10 mg capsule Take 10 mg by mouth daily. No Known Allergies     Social History     Tobacco Use    Smoking status: Current Every Day Smoker     Packs/day: 1.00     Years: 25.00     Pack years: 25.00    Smokeless tobacco: Never Used    Tobacco comment: less than one PPD;    Substance Use Topics    Alcohol use: Yes     Comment: occasionally drinks wine    Drug use: No         Review of Systems   Constitutional: Negative for chills, fever and weight loss. HENT: Negative for nosebleeds. Eyes: Negative for blurred vision and double vision. Respiratory: Positive for shortness of breath.  Negative for cough and wheezing. Cardiovascular: Negative for chest pain, palpitations, orthopnea, claudication, leg swelling and PND. Gastrointestinal: Negative for abdominal pain, heartburn, nausea and vomiting. Genitourinary: Negative for dysuria and hematuria. Musculoskeletal: Negative for falls and myalgias. Skin: Negative for rash. Neurological: Negative for dizziness, focal weakness and headaches. Endo/Heme/Allergies: Does not bruise/bleed easily. Psychiatric/Behavioral: Negative for substance abuse. Visit Vitals  /80 (BP 1 Location: Right arm, BP Patient Position: Sitting)   Pulse 78   Ht 6' 3\" (1.905 m)   Wt 103 kg (227 lb)   SpO2 97%   BMI 28.37 kg/m²       Physical Exam   Constitutional: He is oriented to person, place, and time. He appears well-developed and well-nourished. HENT:   Head: Normocephalic and atraumatic. Eyes: Conjunctivae are normal.   Neck: Neck supple. No JVD present. Carotid bruit is not present. Cardiovascular: Normal rate, regular rhythm, S1 normal, S2 normal and normal pulses. Exam reveals no gallop and no S3. No murmur heard. Pulmonary/Chest: Breath sounds normal. He has no wheezes. He has no rales. Abdominal: Soft. Bowel sounds are normal. There is no abdominal tenderness. Musculoskeletal:         General: No edema. Neurological: He is alert and oriented to person, place, and time. Skin: Skin is warm and dry. EKG: Normal sinus rhythm, normal axis, borderline low voltage, poor R-wave progression, no ST-T wave changes. Normal QTc interval.  Compared to previous EKG, heart rate has increased. ASSESSMENT and PLAN    Nonobstructive coronary disease. This was discovered on cardiac catheterization back in 2007. He underwent a normal pharmacologic nuclear stress test in January 2019 which was low risk. More recently, he underwent an exercise treadmill stress test in October 2019 to evaluate left-sided chest pain.   His treadmill stress test was low risk he exercised for 9-1/2 minutes using the standard Lawson protocol achieving a total workload of 12 METS without any reproducible symptoms or EKG changes. No anginal type symptoms since last visit. Essential hypertension. Patient blood pressure is well controlled on his current medical regimen which includes amlodipine and ramipril, both of which I would continue. Dyslipidemia. Patient has been treated with atorvastatin 40 mg daily, which has been followed by his PCP. He states his lipids have been well controlled on this regimen. Tobacco use disorder. Patient continues to smoke a pack of cigarettes a day. He is strongly advised to quit smoking completely.     Follow-up annually, sooner if needed

## 2021-08-30 ENCOUNTER — OFFICE VISIT (OUTPATIENT)
Dept: CARDIOLOGY CLINIC | Age: 71
End: 2021-08-30
Payer: MEDICARE

## 2021-08-30 VITALS
DIASTOLIC BLOOD PRESSURE: 72 MMHG | HEIGHT: 75 IN | WEIGHT: 229 LBS | HEART RATE: 61 BPM | SYSTOLIC BLOOD PRESSURE: 120 MMHG | BODY MASS INDEX: 28.47 KG/M2 | OXYGEN SATURATION: 97 %

## 2021-08-30 DIAGNOSIS — I11.9 BENIGN HYPERTENSIVE HEART DISEASE WITHOUT HEART FAILURE: ICD-10-CM

## 2021-08-30 DIAGNOSIS — I25.10 CORONARY ARTERY DISEASE INVOLVING NATIVE CORONARY ARTERY OF NATIVE HEART WITHOUT ANGINA PECTORIS: Primary | ICD-10-CM

## 2021-08-30 DIAGNOSIS — E78.00 PURE HYPERCHOLESTEROLEMIA: ICD-10-CM

## 2021-08-30 DIAGNOSIS — F17.200 TOBACCO USE DISORDER: ICD-10-CM

## 2021-08-30 PROCEDURE — 99214 OFFICE O/P EST MOD 30 MIN: CPT | Performed by: INTERNAL MEDICINE

## 2021-08-30 PROCEDURE — 93000 ELECTROCARDIOGRAM COMPLETE: CPT | Performed by: INTERNAL MEDICINE

## 2021-08-30 PROCEDURE — G8536 NO DOC ELDER MAL SCRN: HCPCS | Performed by: INTERNAL MEDICINE

## 2021-08-30 PROCEDURE — G8510 SCR DEP NEG, NO PLAN REQD: HCPCS | Performed by: INTERNAL MEDICINE

## 2021-08-30 PROCEDURE — G8419 CALC BMI OUT NRM PARAM NOF/U: HCPCS | Performed by: INTERNAL MEDICINE

## 2021-08-30 PROCEDURE — 1101F PT FALLS ASSESS-DOCD LE1/YR: CPT | Performed by: INTERNAL MEDICINE

## 2021-08-30 PROCEDURE — G8427 DOCREV CUR MEDS BY ELIG CLIN: HCPCS | Performed by: INTERNAL MEDICINE

## 2021-08-30 PROCEDURE — 3017F COLORECTAL CA SCREEN DOC REV: CPT | Performed by: INTERNAL MEDICINE

## 2021-08-30 NOTE — PROGRESS NOTES
HISTORY OF PRESENT ILLNESS  Isabel Dawkins is a 70 y.o. male. Follow-up  Pertinent negatives include no chest pain ( Atypical), no abdominal pain, no headaches and no shortness of breath. Patient presents for a follow-up office visit. He has a past medical history significant for mild nonobstructive coronary disease assessed on cardiac catheterization back in 2007. He last underwent a normal exercise stress echocardiogram in December 2015. He also has a history of essential hypertension and dyslipidemia which has been well controlled on medical therapy. In January 2019, he was complaining of chest pain and exertional dyspnea. As result he underwent both an echocardiogram and a pharmacologic nuclear stress test, both of which were unremarkable. His nuclear stress test was a low risk, no ischemia or infarction, EF 54%. His echocardiogram did not show any valvular heart disease, preserved LV function with a mildly dilated aortic root. Patient then underwent a regular treadmill stress test in October 2019 to assess his activity tolerance. He was able to exercise for 9-1/2 minutes using the standard Lawson protocol without any concerning EKG changes or anginal type symptoms. He had appropriate heart rate and blood pressure response to exertion. He was last seen in the office 1 year ago. Since last visit he states been feeling well. He will notice an occasional sharp chest pain under his left breast which is worse when he is lifting something heavy. He does not notice his other exertional activity. No associated shortness of breath, dizziness or diaphoresis. No major change in his activity tolerance.     Past Medical History:   Diagnosis Date    Bilateral cataracts     removed in 2005    CAD (coronary artery disease), native coronary artery     mild by invasive eval (oct 2007)    Calculus of kidney     ED (erectile dysfunction)     GERD (gastroesophageal reflux disease)    Irving Bookbinder hematuria     Hypercholesterolemia     Hypertension     Hypertension     Incomplete bladder emptying     Nephrolithiasis     Normal cardiac stress test 12/2015    Normal stress echocardiogram.  Total exercise time 12 minutes, EF 60%, no regional wall motion abnormalities.  Normal nuclear stress test 06/15/2010    No ischemia or infarction. EF 64%. Ex time 13:30.    Peyronie's disease     S/P cardiac cath 10/05/2007    LM patent. pLAD <20% plaquing. oLCX 30%. RCA patent. LVEDP 20.  EF 60%. Mild arterial hypertension.  Tobacco use disorder     ongoing (July 2011)    Urinary retention      Current Outpatient Medications   Medication Sig Dispense Refill    dutasteride (AVODART) 0.5 mg capsule TAKE 1 CAPSULE BY MOUTH EVERY FRIDAY 12 Cap 7    tri mix compound DISPENSE COMPOUNDEDTRI-MIX (PGE 1 25 mcg/  Papaverine 30 mg/  Phentolamine 1 mg/). USE AS DIRECTED. 10 mL 5    hydroCHLOROthiazide (MICROZIDE) 12.5 mg capsule Take 12.5 mg by mouth daily.  sildenafiL, pulmonary hypertension, (Revatio) 20 mg tablet Take 1-5 tabs by mouth one hour prior to sexual activity. Do not exceed 100 mg in 24 hrs. 90 Tab 3    Syringe with Needle, Disp, (ALLERGY SYRINGE) 1 mL 27 x 1/2\" syrg FOR USE WITH INTRACAVERNOSAL INJECTIONS. 25 Pen Needle 2    amLODIPine (NORVASC) 10 mg tablet Take 1 Tab by mouth daily. Indications: hypertension 90 Tab 3    atorvastatin (LIPITOR) 40 mg tablet TAKE 1 TAB BY MOUTH EVERY NIGHT AT BEDTIME. 5    ramipril (ALTACE) 10 mg capsule Take 10 mg by mouth daily. No Known Allergies     Social History     Tobacco Use    Smoking status: Current Every Day Smoker     Packs/day: 1.00     Years: 25.00     Pack years: 25.00    Smokeless tobacco: Never Used    Tobacco comment: less than one PPD;    Substance Use Topics    Alcohol use: Yes     Comment: occasionally drinks wine    Drug use: No         Review of Systems   Constitutional: Negative for chills, fever and weight loss. HENT: Negative for nosebleeds. Eyes: Negative for blurred vision and double vision. Respiratory: Negative for cough, shortness of breath and wheezing. Cardiovascular: Negative for chest pain ( Atypical), palpitations, orthopnea, claudication, leg swelling and PND. Gastrointestinal: Negative for abdominal pain, heartburn, nausea and vomiting. Genitourinary: Negative for dysuria and hematuria. Musculoskeletal: Negative for falls and myalgias. Skin: Negative for rash. Neurological: Negative for dizziness, focal weakness and headaches. Endo/Heme/Allergies: Does not bruise/bleed easily. Psychiatric/Behavioral: Negative for substance abuse. Visit Vitals  /72 (BP 1 Location: Left upper arm, BP Patient Position: Sitting, BP Cuff Size: Small adult)   Pulse 61   Ht 6' 3\" (1.905 m)   Wt 103.9 kg (229 lb)   SpO2 97%   BMI 28.62 kg/m²       Physical Exam  Constitutional:       Appearance: He is well-developed. HENT:      Head: Normocephalic and atraumatic. Eyes:      Conjunctiva/sclera: Conjunctivae normal.   Neck:      Vascular: No carotid bruit or JVD. Cardiovascular:      Rate and Rhythm: Normal rate and regular rhythm. Pulses: Normal pulses. Heart sounds: S1 normal and S2 normal. No murmur heard. No gallop. No S3 sounds. Pulmonary:      Breath sounds: Normal breath sounds. No wheezing or rales. Abdominal:      General: Bowel sounds are normal.      Palpations: Abdomen is soft. Tenderness: There is no abdominal tenderness. Musculoskeletal:         General: No swelling. Cervical back: Neck supple. Skin:     General: Skin is warm and dry. Neurological:      Mental Status: He is alert and oriented to person, place, and time. Psychiatric:         Mood and Affect: Mood normal.         Behavior: Behavior normal.       EKG: Normal sinus rhythm, normal axis, borderline low voltage, poor R-wave progression, no ST-T wave changes.   Normal QTc interval.  Compared to previous EKG, no significant will change. ASSESSMENT and PLAN    Nonobstructive coronary disease. This was discovered on cardiac catheterization back in 2007. More recently, he underwent an exercise treadmill stress test in October 2019 to evaluate left-sided chest pain. His treadmill stress test was low risk he exercised for 9-1/2 minutes using the standard Lawson protocol achieving a total workload of 12 METS without any reproducible symptoms or EKG changes. No anginal type symptoms since last visit. The chest pain he reports now sounds very atypical for angina I suspect most likely musculoskeletal in nature. Essential hypertension. Patient blood pressure is well controlled on his current medical regimen which includes amlodipine and ramipril, both of which I would continue. Dyslipidemia. Patient has been treated with atorvastatin 40 mg daily, which has been followed by his PCP. He states his lipids have been well controlled on this regimen. Tobacco use disorder. Patient continues to smoke a pack of cigarettes a day. He he was again advised to quit smoking completely. Follow-up annually, sooner if needed.

## 2021-08-30 NOTE — PROGRESS NOTES
Rob Steinberg presents today for   Chief Complaint   Patient presents with    Follow-up     1 year follow up       Rob Steinberg preferred language for health care discussion is english/other. Is someone accompanying this pt? no    Is the patient using any DME equipment during [address]? no    Depression Screening:  3 most recent PHQ Screens 8/30/2021   Little interest or pleasure in doing things Not at all   Feeling down, depressed, irritable, or hopeless Not at all   Total Score PHQ 2 0       Learning Assessment:  Learning Assessment 8/30/2021   PRIMARY LEARNER Patient   CO-LEARNER CAREGIVER -   PRIMARY LANGUAGE ENGLISH   LEARNER PREFERENCE PRIMARY DEMONSTRATION   ANSWERED BY patient   RELATIONSHIP SELF       Abuse Screening:  Abuse Screening Questionnaire 8/30/2021   Do you ever feel afraid of your partner? N   Are you in a relationship with someone who physically or mentally threatens you? N   Is it safe for you to go home? Y       Fall Risk  Fall Risk Assessment, last 12 mths 8/30/2021   Able to walk? Yes   Fall in past 12 months? 0   Do you feel unsteady? 0   Are you worried about falling 0           Pt currently taking Anticoagulant therapy? no    Pt currently taking Antiplatelet therapy ? no      Coordination of Care:  1. Have you been to the ER, urgent care clinic since your last visit? Hospitalized since your last visit? no    2. Have you seen or consulted any other health care providers outside of the [address] [place] [address] since your last visit? Include any pap smears or colon screening.  no

## 2022-08-29 ENCOUNTER — OFFICE VISIT (OUTPATIENT)
Dept: CARDIOLOGY CLINIC | Age: 72
End: 2022-08-29
Payer: MEDICARE

## 2022-08-29 VITALS
HEIGHT: 75 IN | SYSTOLIC BLOOD PRESSURE: 120 MMHG | WEIGHT: 221 LBS | BODY MASS INDEX: 27.48 KG/M2 | DIASTOLIC BLOOD PRESSURE: 80 MMHG | OXYGEN SATURATION: 96 % | HEART RATE: 51 BPM

## 2022-08-29 DIAGNOSIS — I25.10 CORONARY ARTERY DISEASE INVOLVING NATIVE CORONARY ARTERY OF NATIVE HEART WITHOUT ANGINA PECTORIS: Primary | ICD-10-CM

## 2022-08-29 DIAGNOSIS — R94.31 ABNORMAL EKG: ICD-10-CM

## 2022-08-29 DIAGNOSIS — F17.200 TOBACCO USE DISORDER: ICD-10-CM

## 2022-08-29 DIAGNOSIS — E78.00 PURE HYPERCHOLESTEROLEMIA: ICD-10-CM

## 2022-08-29 DIAGNOSIS — I11.9 BENIGN HYPERTENSIVE HEART DISEASE WITHOUT HEART FAILURE: ICD-10-CM

## 2022-08-29 PROCEDURE — 99214 OFFICE O/P EST MOD 30 MIN: CPT | Performed by: INTERNAL MEDICINE

## 2022-08-29 PROCEDURE — 93000 ELECTROCARDIOGRAM COMPLETE: CPT | Performed by: INTERNAL MEDICINE

## 2022-08-29 PROCEDURE — 1101F PT FALLS ASSESS-DOCD LE1/YR: CPT | Performed by: INTERNAL MEDICINE

## 2022-08-29 PROCEDURE — G8417 CALC BMI ABV UP PARAM F/U: HCPCS | Performed by: INTERNAL MEDICINE

## 2022-08-29 PROCEDURE — G8536 NO DOC ELDER MAL SCRN: HCPCS | Performed by: INTERNAL MEDICINE

## 2022-08-29 PROCEDURE — G8510 SCR DEP NEG, NO PLAN REQD: HCPCS | Performed by: INTERNAL MEDICINE

## 2022-08-29 PROCEDURE — 1123F ACP DISCUSS/DSCN MKR DOCD: CPT | Performed by: INTERNAL MEDICINE

## 2022-08-29 PROCEDURE — G8427 DOCREV CUR MEDS BY ELIG CLIN: HCPCS | Performed by: INTERNAL MEDICINE

## 2022-08-29 PROCEDURE — 3017F COLORECTAL CA SCREEN DOC REV: CPT | Performed by: INTERNAL MEDICINE

## 2022-08-29 NOTE — PROGRESS NOTES
HISTORY OF PRESENT ILLNESS  Viviana Singh is a 67 y.o. male. Follow-up  Pertinent negatives include no chest pain, no abdominal pain, no headaches and no shortness of breath. Patient presents for a follow-up office visit. He has a past medical history significant for mild nonobstructive coronary disease assessed on cardiac catheterization back in 2007. In January 2019, he was complaining of chest pain and exertional dyspnea. As result he underwent both an echocardiogram and a pharmacologic nuclear stress test, both of which were unremarkable. His nuclear stress test was a low risk, no ischemia or infarction, EF 54%. His echocardiogram did not show any valvular heart disease, preserved LV function with a mildly dilated aortic root. Patient then underwent a regular treadmill stress test in October 2019 to assess his activity tolerance. He was able to exercise for 9-1/2 minutes using the standard Lawson protocol without any concerning EKG changes or anginal type symptoms. He had appropriate heart rate and blood pressure response to exertion. He was last seen in the office 1 year ago. Since last visit, he reports he tore his Achilles tendon at the beginning of January of this year and as result was in a walking boot so he has been much less active this year compared to last year. He also underwent left inguinal hernia redo surgery in June of this year for which she is still recovering. He denies any new chest pain or worsening shortness of breath. No leg swelling, no orthopnea, no PND.     Past Medical History:   Diagnosis Date    Bilateral cataracts     removed in 2005    CAD (coronary artery disease), native coronary artery     mild by invasive eval (oct 2007)    Calculus of kidney     ED (erectile dysfunction)     GERD (gastroesophageal reflux disease)     Gross hematuria     Hypercholesterolemia     Hypertension     Hypertension     Incomplete bladder emptying     Nephrolithiasis     Normal cardiac stress test 12/2015    Normal stress echocardiogram.  Total exercise time 12 minutes, EF 60%, no regional wall motion abnormalities. Normal nuclear stress test 06/15/2010    No ischemia or infarction. EF 64%. Ex time 13:30. Peyronie's disease     S/P cardiac cath 10/05/2007    LM patent. pLAD <20% plaquing. oLCX 30%. RCA patent. LVEDP 20.  EF 60%. Mild arterial hypertension. Tobacco use disorder     ongoing (July 2011)    Urinary retention      Current Outpatient Medications   Medication Sig Dispense Refill    dutasteride (AVODART) 0.5 mg capsule TAKE 1 CAPSULE BY MOUTH EVERY FRIDAY 12 Capsule 7    tri mix compound PGE1  40 mcg/ml  Papaverine 40  mg/ml   Phentolamine 2 mg/ml. Patient to inject 0.5 ml as directed. May titrate as needed. 10 mL 5    safety syr with ndl,disp, tray (Easy Touch Syr Allergy Tray) 1 mL 27 gauge x 1/2\" tray 1 Each by Does Not Apply route as needed (For intracavernosl injection). 25 Pen Needle 3    doxycycline (VIBRAMYCIN) 100 mg capsule Take 100 mg by mouth two (2) times a day. acetaminophen (TYLENOL) 325 mg tablet Take 325 mg by mouth every four (4) hours as needed. hydroCHLOROthiazide (MICROZIDE) 12.5 mg capsule Take 12.5 mg by mouth daily. sildenafiL, pulmonary hypertension, (Revatio) 20 mg tablet Take 1-5 tabs by mouth one hour prior to sexual activity. Do not exceed 100 mg in 24 hrs. 90 Tab 3    Syringe with Needle, Disp, (ALLERGY SYRINGE) 1 mL 27 x 1/2\" syrg FOR USE WITH INTRACAVERNOSAL INJECTIONS. 25 Pen Needle 2    amLODIPine (NORVASC) 10 mg tablet Take 1 Tab by mouth daily. Indications: hypertension 90 Tab 3    atorvastatin (LIPITOR) 40 mg tablet TAKE 1 TAB BY MOUTH EVERY NIGHT AT BEDTIME. 5    ramipriL (ALTACE) 10 mg capsule Take 10 mg by mouth daily.        No Known Allergies     Social History     Tobacco Use    Smoking status: Every Day     Packs/day: 1.00     Years: 25.00     Pack years: 25.00     Types: Cigarettes    Smokeless tobacco: Never    Tobacco comments:     less than one PPD;    Vaping Use    Vaping Use: Never used   Substance Use Topics    Alcohol use: Yes     Comment: occasionally drinks wine    Drug use: No     Family History   Problem Relation Age of Onset    Pacemaker Mother     Heart Attack Father          Review of Systems   Constitutional:  Negative for chills, fever and weight loss. HENT:  Negative for nosebleeds. Eyes:  Negative for blurred vision and double vision. Respiratory:  Negative for cough, shortness of breath and wheezing. Cardiovascular:  Negative for chest pain, palpitations, orthopnea, claudication, leg swelling and PND. Gastrointestinal:  Negative for abdominal pain, heartburn, nausea and vomiting. Genitourinary:  Negative for dysuria and hematuria. Musculoskeletal:  Negative for falls and myalgias. Skin:  Negative for rash. Neurological:  Negative for dizziness, focal weakness and headaches. Endo/Heme/Allergies:  Does not bruise/bleed easily. Psychiatric/Behavioral:  Negative for substance abuse. Visit Vitals  /80 (BP 1 Location: Left upper arm, BP Patient Position: Sitting, BP Cuff Size: Adult)   Pulse (!) 51   Ht 6' 3\" (1.905 m)   Wt 100.2 kg (221 lb)   SpO2 96%   BMI 27.62 kg/m²       Physical Exam  Constitutional:       Appearance: He is well-developed. HENT:      Head: Normocephalic and atraumatic. Eyes:      Conjunctiva/sclera: Conjunctivae normal.   Neck:      Vascular: No carotid bruit or JVD. Cardiovascular:      Rate and Rhythm: Regular rhythm. Bradycardia present. Pulses: Normal pulses. Heart sounds: S1 normal and S2 normal. No murmur heard. No gallop. No S3 sounds. Pulmonary:      Breath sounds: Normal breath sounds. No wheezing or rales. Abdominal:      General: Bowel sounds are normal.      Palpations: Abdomen is soft. Tenderness: There is no abdominal tenderness. Musculoskeletal:         General: No swelling.       Cervical back: Neck supple. Skin:     General: Skin is warm and dry. Neurological:      Mental Status: He is alert and oriented to person, place, and time. Psychiatric:         Mood and Affect: Mood normal.         Behavior: Behavior normal.     EKG: Sinus bradycardia, normal axis, borderline low voltage, poor R-wave progression, inferior T wave inversions, cannot exclude ischemia. Normal QTc interval.  Compared to previous EKG, inferior T wave inversions are now present. ASSESSMENT and PLAN    Abnormal EKG. Patient with new inferior T wave inversions on his EKG today. Given his previous history of nonobstructive CAD, I would recommend a stress echocardiogram later this year. He currently denies any anginal type symptoms. Nonobstructive coronary disease. This was discovered on cardiac catheterization back in 2007. The last underwent an exercise treadmill stress test in October 2019 to evaluate left-sided chest pain. His treadmill stress test was low risk he exercised for 9-1/2 minutes using the standard Lawson protocol achieving a total workload of 12 METS without any reproducible symptoms or EKG changes. A repeat stress test will be ordered as described above. Essential hypertension. Patient blood pressure is well controlled on his current medical regimen which includes amlodipine, HCTZ, and ramipril, all of which I would continue. Dyslipidemia. Patient has been treated with atorvastatin 40 mg daily, which has been followed by his PCP. He states his lipids have been well controlled on this regimen. Tobacco use disorder. Patient continues to smoke a pack of cigarettes a day. He he was again advised to quit smoking completely. As well as his repeat stress test is still low risk, he can continue to follow-up annually, sooner if needed.

## 2022-08-29 NOTE — PROGRESS NOTES
Rodolfo Leyva presents today for   Chief Complaint   Patient presents with    Follow-up     1 year       Rodolfo Leyva preferred language for health care discussion is english/other. Is someone accompanying this pt? no    Is the patient using any DME equipment during [address]? no    Depression Screening:  3 most recent PHQ Screens 8/29/2022   Little interest or pleasure in doing things Not at all   Feeling down, depressed, irritable, or hopeless Not at all   Total Score PHQ 2 0       Learning Assessment:  Learning Assessment 8/29/2022   PRIMARY LEARNER Patient   CO-LEARNER CAREGIVER -   PRIMARY LANGUAGE ENGLISH   LEARNER PREFERENCE PRIMARY DEMONSTRATION   ANSWERED BY patient   RELATIONSHIP SELF       Abuse Screening:  Abuse Screening Questionnaire 8/30/2021   Do you ever feel afraid of your partner? N   Are you in a relationship with someone who physically or mentally threatens you? N   Is it safe for you to go home? Y       Fall Risk  Fall Risk Assessment, last 12 mths 8/30/2021   Able to walk? Yes   Fall in past 12 months? 0   Do you feel unsteady? 0   Are you worried about falling 0           Pt currently taking Anticoagulant therapy? no    Pt currently taking Antiplatelet therapy ? no      Coordination of Care:  1. Have you been to the ER, urgent care clinic since your last visit? Hospitalized since your last visit? no    2. Have you seen or consulted any other health care providers outside of the [address] [place] [address] since your last visit? Include any pap smears or colon screening.  no

## 2022-10-31 ENCOUNTER — TELEPHONE (OUTPATIENT)
Dept: CARDIOLOGY CLINIC | Age: 72
End: 2022-10-31

## 2022-11-02 ENCOUNTER — TELEPHONE (OUTPATIENT)
Dept: CARDIOLOGY CLINIC | Age: 72
End: 2022-11-02

## 2022-11-04 ENCOUNTER — TELEPHONE (OUTPATIENT)
Dept: CARDIOLOGY CLINIC | Age: 72
End: 2022-11-04

## 2022-11-04 NOTE — TELEPHONE ENCOUNTER
----- Message from Yasmine Salmeron MD sent at 11/4/2022 11:08 AM EDT -----  Please let the patient know that his stress test was normal and low risk.  ----- Message -----  From: Carline Johnson LPN  Sent: [id number]   9:25 AM EDT  To: Yasmine Salmeron MD    Per your note - Nonobstructive coronary disease. This was discovered on cardiac catheterization back in 2007. The last underwent an exercise treadmill stress test in October 2019 to evaluate left-sided chest pain. His treadmill stress test was low risk he exercised for 9-1/2 minutes using the standard Lawson protocol achieving a total workload of 12 METS without any reproducible symptoms or EKG changes. A repeat stress test will be ordered as described above.

## 2022-11-04 NOTE — TELEPHONE ENCOUNTER
Patient made aware of stress test results and Dr. Danyel Gupta remarks. No questions or concerns at present.

## 2023-03-14 ENCOUNTER — OFFICE VISIT (OUTPATIENT)
Age: 73
End: 2023-03-14
Payer: MEDICARE

## 2023-03-14 VITALS
HEIGHT: 75 IN | BODY MASS INDEX: 28.85 KG/M2 | DIASTOLIC BLOOD PRESSURE: 88 MMHG | OXYGEN SATURATION: 96 % | HEART RATE: 71 BPM | SYSTOLIC BLOOD PRESSURE: 120 MMHG | WEIGHT: 232 LBS

## 2023-03-14 VITALS — HEIGHT: 75 IN | BODY MASS INDEX: 28.12 KG/M2

## 2023-03-14 DIAGNOSIS — F17.200 NICOTINE DEPENDENCE, UNCOMPLICATED, UNSPECIFIED NICOTINE PRODUCT TYPE: ICD-10-CM

## 2023-03-14 DIAGNOSIS — I25.10 ATHEROSCLEROSIS OF NATIVE CORONARY ARTERY WITHOUT ANGINA PECTORIS, UNSPECIFIED WHETHER NATIVE OR TRANSPLANTED HEART: Primary | ICD-10-CM

## 2023-03-14 DIAGNOSIS — E78.00 PURE HYPERCHOLESTEROLEMIA, UNSPECIFIED: ICD-10-CM

## 2023-03-14 DIAGNOSIS — I11.9 HYPERTENSIVE HEART DISEASE WITHOUT HEART FAILURE: ICD-10-CM

## 2023-03-14 DIAGNOSIS — I71.40 ABDOMINAL AORTIC ANEURYSM (AAA) WITHOUT RUPTURE, UNSPECIFIED PART: ICD-10-CM

## 2023-03-14 DIAGNOSIS — R94.31 ABNORMAL ELECTROCARDIOGRAM (ECG) (EKG): ICD-10-CM

## 2023-03-14 PROCEDURE — 3017F COLORECTAL CA SCREEN DOC REV: CPT | Performed by: INTERNAL MEDICINE

## 2023-03-14 PROCEDURE — 4004F PT TOBACCO SCREEN RCVD TLK: CPT | Performed by: INTERNAL MEDICINE

## 2023-03-14 PROCEDURE — 93000 ELECTROCARDIOGRAM COMPLETE: CPT | Performed by: INTERNAL MEDICINE

## 2023-03-14 PROCEDURE — 99214 OFFICE O/P EST MOD 30 MIN: CPT | Performed by: INTERNAL MEDICINE

## 2023-03-14 PROCEDURE — G8417 CALC BMI ABV UP PARAM F/U: HCPCS | Performed by: INTERNAL MEDICINE

## 2023-03-14 PROCEDURE — 1123F ACP DISCUSS/DSCN MKR DOCD: CPT | Performed by: INTERNAL MEDICINE

## 2023-03-14 PROCEDURE — G8427 DOCREV CUR MEDS BY ELIG CLIN: HCPCS | Performed by: INTERNAL MEDICINE

## 2023-03-14 PROCEDURE — G8484 FLU IMMUNIZE NO ADMIN: HCPCS | Performed by: INTERNAL MEDICINE

## 2023-03-14 ASSESSMENT — ENCOUNTER SYMPTOMS
COUGH: 0
ABDOMINAL DISTENTION: 0
SHORTNESS OF BREATH: 0
VOMITING: 0
NAUSEA: 0
ABDOMINAL PAIN: 0
SORE THROAT: 0

## 2023-03-14 ASSESSMENT — ANXIETY QUESTIONNAIRES
GAD7 TOTAL SCORE: 0
4. TROUBLE RELAXING: 0
7. FEELING AFRAID AS IF SOMETHING AWFUL MIGHT HAPPEN: 0
2. NOT BEING ABLE TO STOP OR CONTROL WORRYING: 0
5. BEING SO RESTLESS THAT IT IS HARD TO SIT STILL: 0
1. FEELING NERVOUS, ANXIOUS, OR ON EDGE: 0
3. WORRYING TOO MUCH ABOUT DIFFERENT THINGS: 0
6. BECOMING EASILY ANNOYED OR IRRITABLE: 0

## 2023-03-14 ASSESSMENT — PATIENT HEALTH QUESTIONNAIRE - PHQ9
SUM OF ALL RESPONSES TO PHQ QUESTIONS 1-9: 0
2. FEELING DOWN, DEPRESSED OR HOPELESS: 0
SUM OF ALL RESPONSES TO PHQ QUESTIONS 1-9: 0
1. LITTLE INTEREST OR PLEASURE IN DOING THINGS: 0
SUM OF ALL RESPONSES TO PHQ9 QUESTIONS 1 & 2: 0

## 2023-03-14 NOTE — PROGRESS NOTES
03/14/23     Carlos Mejia  is a 67 y.o. male     Chief Complaint   Patient presents with    Follow-up     Add on: ct scan with erasmo showed abdominal aortic aneurysmal dilation       HPI  Patient presents for an add-on office visit. He has a past medical history significant for mild nonobstructive coronary disease assessed on cardiac catheterization back in 2007. In January 2019, he was complaining of chest pain and exertional dyspnea. As result he underwent both an echocardiogram and a pharmacologic nuclear stress test, both of which were unremarkable. His nuclear stress test was a low risk, no ischemia or infarction, EF 54%. His echocardiogram did not show any valvular heart disease, preserved LV function with a mildly dilated aortic root. Patient underwent an exercise nuclear stress test in November 2022 to evaluate nonspecific T wave inversions on his EKG. He exercised for 9-1/2 minutes using the Fito protocol. He had no chest pain or worsening EKG changes. His perfusion imaging was negative for ischemia, EF 64%. He returns today after undergoing a CT scan of his abdomen pelvis to evaluate for kidney stones. He was found to have mild aneurysmal dilatation of his abdominal aorta measuring 3.4 cm in diameter. He denies any major change in his activity tolerance. No chest pain, shortness of breath or abdominal pain. Past Medical History:   Diagnosis Date    Benign hypertensive heart disease without heart failure     Bilateral cataracts     removed in 2005    CAD (coronary artery disease), native coronary artery     mild by invasive eval (oct 2007)    Calculus of kidney     ED (erectile dysfunction)     GERD (gastroesophageal reflux disease)     Gross hematuria     Hypertension     Incomplete bladder emptying     Nephrolithiasis     Normal cardiac stress test 12/2015    Normal stress echocardiogram.  Total exercise time 12 minutes, EF 60%, no regional wall motion abnormalities. Peyronie's disease     Pure hypercholesterolemia     S/P cardiac cath 10/05/2007    LM patent. pLAD <20% plaquing. oLCX 30%. RCA patent. LVEDP 20.  EF 60%. Mild arterial hypertension. Tobacco use disorder     ongoing (July 2011)    Urinary retention      Current Outpatient Medications   Medication Sig Dispense Refill    acetaminophen (TYLENOL) 325 MG tablet Take 325 mg by mouth every 4 hours as needed      amLODIPine (NORVASC) 10 MG tablet Take 10 mg by mouth daily      atorvastatin (LIPITOR) 40 MG tablet Take 40 mg by mouth daily      dutasteride (AVODART) 0.5 MG capsule Take 0.5 mg by mouth once a week      hydroCHLOROthiazide (MICROZIDE) 12.5 MG capsule Take 12.5 mg by mouth daily      ondansetron (ZOFRAN-ODT) 4 MG disintegrating tablet Take 4 mg by mouth every 8 hours as needed ceived the following from [facility]: Outside name: ondansetron (ZOFRAN ODT) 4 mg disintegrating tablet      ramipril (ALTACE) 10 MG capsule Take 10 mg by mouth daily      sildenafil (REVATIO) 20 MG tablet Take 20 mg by mouth 3 times daily as needed      tamsulosin (FLOMAX) 0.4 MG capsule Take 0.4 mg by mouth daily       No current facility-administered medications for this visit.      No Known Allergies  Social History     Tobacco Use    Smoking status: Every Day     Packs/day: 1.00     Types: Cigarettes    Smokeless tobacco: Never   Vaping Use    Vaping Use: Never used   Substance Use Topics    Alcohol use: Yes    Drug use: No     Family History   Problem Relation Age of Onset    Pacemaker Mother     Heart Attack Father     No Known Problems Sister     No Known Problems Brother     No Known Problems Maternal Aunt     No Known Problems Maternal Uncle     No Known Problems Paternal Aunt     No Known Problems Paternal Uncle     No Known Problems Maternal Grandmother     No Known Problems Maternal Grandfather     No Known Problems Paternal Grandmother     No Known Problems Paternal Grandfather     No Known Problems Other          Review of Systems   Constitutional:  Negative for chills, fatigue and fever. HENT:  Negative for congestion, hearing loss, nosebleeds and sore throat. Eyes:  Negative for visual disturbance. Respiratory:  Negative for cough and shortness of breath. Cardiovascular:  Negative for chest pain, palpitations and leg swelling. Gastrointestinal:  Negative for abdominal distention, abdominal pain, nausea and vomiting. Endocrine: Negative for cold intolerance and heat intolerance. Genitourinary:  Negative for dysuria. Musculoskeletal:  Negative for arthralgias. Skin:  Negative for rash. Neurological:  Negative for dizziness, syncope, weakness and headaches. Hematological:  Does not bruise/bleed easily. Psychiatric/Behavioral:  Negative for suicidal ideas. /88 (Site: Left Upper Arm, Position: Sitting, Cuff Size: Medium Adult)   Pulse 71   Ht 6' 3\" (1.905 m)   Wt 232 lb (105.2 kg)   SpO2 96%   BMI 29.00 kg/m²     Objective:   Physical Exam  Constitutional:       General: He is not in acute distress. HENT:      Head: Normocephalic. Neck:      Vascular: No carotid bruit or JVD. Cardiovascular:      Rate and Rhythm: Normal rate and regular rhythm. No extrasystoles are present. Heart sounds: No murmur heard. No gallop. Pulmonary:      Effort: Pulmonary effort is normal.      Breath sounds: No wheezing, rhonchi or rales. Abdominal:      General: Bowel sounds are normal. There is no distension. Palpations: Abdomen is soft. Tenderness: There is no abdominal tenderness. Musculoskeletal:         General: No swelling or deformity. Skin:     General: Skin is warm and dry. Findings: No rash. Neurological:      General: No focal deficit present. Mental Status: He is alert and oriented to person, place, and time.    Psychiatric:         Mood and Affect: Mood normal.         Behavior: Behavior normal.       EKG: Normal sinus rhythm, normal axis, normal QTc interval, nonspecific T wave inversion, borderline poor R wave progression. No change compared to the previous EKG. Assessment / Plan:   Abnormal EKG. patient recently underwent a low risk exercise nuclear stress test in November 2022. No new symptoms concerning for angina. No further work-up necessary at this time. Nonobstructive coronary disease. This was discovered on cardiac catheterization back in 2007. The last underwent an exercise nuclear stress test in November 2022 as described above. He remains on a potent statin which I would continue. Essential hypertension. Patient blood pressure is well controlled on his current medical regimen which includes amlodipine, HCTZ, and ramipril, all of which I would continue. Dyslipidemia. Patient has been treated with atorvastatin 40 mg daily, which has been followed by his PCP. He states his lipids have been well controlled on this regimen. Tobacco use disorder. Patient continues to smoke a pack of cigarettes a day. He he was again advised to quit smoking completely. AAA. Small in size measuring 3.4 cm in diameter on a recent CT scan which was done to evaluate for kidney stones. I recommend refer to vascular surgery for longitudinal follow-up. Smoking cessation was recommended. Follow-up annually, sooner if needed.     Chema Andujar MD

## 2023-03-14 NOTE — PROGRESS NOTES
Jerrod Rivas presents today for   Chief Complaint   Patient presents with    Follow-up     Add on: ct scan with erasmo showed abdominal aortic aneurysmal dilation       Jerrod Rivas preferred language for health care discussion is english/other. Is someone accompanying this pt? no    Is the patient using any DME equipment during OV? no    Depression Screening:  Depression: Not at risk    PHQ-2 Score: 0        Learning Assessment:  Who is the primary learner? Patient    What is the preferred language for health care of the primary learner? ENGLISH    How does the primary learner prefer to learn new concepts? DEMONSTRATION    Answered By patient    Relationship to Learner SELF           Pt currently taking Anticoagulant therapy? no    Pt currently taking Antiplatelet therapy ? no      Coordination of Care:  1. Have you been to the ER, urgent care clinic since your last visit? Hospitalized since your last visit? no    2. Have you seen or consulted any other health care providers outside of the [address] [place] [address] since your last visit? Include any pap smears or colon screening.  no

## 2024-03-11 ENCOUNTER — OFFICE VISIT (OUTPATIENT)
Age: 74
End: 2024-03-11
Payer: MEDICARE

## 2024-03-11 VITALS
OXYGEN SATURATION: 97 % | BODY MASS INDEX: 29.22 KG/M2 | HEART RATE: 64 BPM | WEIGHT: 235 LBS | HEIGHT: 75 IN | SYSTOLIC BLOOD PRESSURE: 132 MMHG | DIASTOLIC BLOOD PRESSURE: 70 MMHG

## 2024-03-11 DIAGNOSIS — E78.00 PURE HYPERCHOLESTEROLEMIA, UNSPECIFIED: ICD-10-CM

## 2024-03-11 DIAGNOSIS — I25.10 ATHEROSCLEROSIS OF NATIVE CORONARY ARTERY OF NATIVE HEART WITHOUT ANGINA PECTORIS: Primary | ICD-10-CM

## 2024-03-11 DIAGNOSIS — I71.40 ABDOMINAL AORTIC ANEURYSM (AAA) WITHOUT RUPTURE, UNSPECIFIED PART (HCC): ICD-10-CM

## 2024-03-11 DIAGNOSIS — R94.31 ABNORMAL ELECTROCARDIOGRAM (ECG) (EKG): ICD-10-CM

## 2024-03-11 DIAGNOSIS — F17.200 NICOTINE DEPENDENCE, UNCOMPLICATED, UNSPECIFIED NICOTINE PRODUCT TYPE: ICD-10-CM

## 2024-03-11 PROCEDURE — G8417 CALC BMI ABV UP PARAM F/U: HCPCS | Performed by: INTERNAL MEDICINE

## 2024-03-11 PROCEDURE — G8484 FLU IMMUNIZE NO ADMIN: HCPCS | Performed by: INTERNAL MEDICINE

## 2024-03-11 PROCEDURE — 93000 ELECTROCARDIOGRAM COMPLETE: CPT | Performed by: INTERNAL MEDICINE

## 2024-03-11 PROCEDURE — 1123F ACP DISCUSS/DSCN MKR DOCD: CPT | Performed by: INTERNAL MEDICINE

## 2024-03-11 PROCEDURE — 99214 OFFICE O/P EST MOD 30 MIN: CPT | Performed by: INTERNAL MEDICINE

## 2024-03-11 PROCEDURE — G8427 DOCREV CUR MEDS BY ELIG CLIN: HCPCS | Performed by: INTERNAL MEDICINE

## 2024-03-11 PROCEDURE — 4004F PT TOBACCO SCREEN RCVD TLK: CPT | Performed by: INTERNAL MEDICINE

## 2024-03-11 PROCEDURE — 3017F COLORECTAL CA SCREEN DOC REV: CPT | Performed by: INTERNAL MEDICINE

## 2024-03-11 ASSESSMENT — ENCOUNTER SYMPTOMS
SORE THROAT: 0
ABDOMINAL DISTENTION: 0
VOMITING: 0
COUGH: 0
NAUSEA: 0
ABDOMINAL PAIN: 0
SHORTNESS OF BREATH: 0

## 2024-03-11 ASSESSMENT — ANXIETY QUESTIONNAIRES
5. BEING SO RESTLESS THAT IT IS HARD TO SIT STILL: 0
7. FEELING AFRAID AS IF SOMETHING AWFUL MIGHT HAPPEN: 0
6. BECOMING EASILY ANNOYED OR IRRITABLE: 0
1. FEELING NERVOUS, ANXIOUS, OR ON EDGE: 0
2. NOT BEING ABLE TO STOP OR CONTROL WORRYING: 0
3. WORRYING TOO MUCH ABOUT DIFFERENT THINGS: 0
4. TROUBLE RELAXING: 0
IF YOU CHECKED OFF ANY PROBLEMS ON THIS QUESTIONNAIRE, HOW DIFFICULT HAVE THESE PROBLEMS MADE IT FOR YOU TO DO YOUR WORK, TAKE CARE OF THINGS AT HOME, OR GET ALONG WITH OTHER PEOPLE: NOT DIFFICULT AT ALL
GAD7 TOTAL SCORE: 0

## 2024-03-11 ASSESSMENT — PATIENT HEALTH QUESTIONNAIRE - PHQ9
SUM OF ALL RESPONSES TO PHQ QUESTIONS 1-9: 0
SUM OF ALL RESPONSES TO PHQ9 QUESTIONS 1 & 2: 0
SUM OF ALL RESPONSES TO PHQ QUESTIONS 1-9: 0
1. LITTLE INTEREST OR PLEASURE IN DOING THINGS: 0
2. FEELING DOWN, DEPRESSED OR HOPELESS: 0
SUM OF ALL RESPONSES TO PHQ QUESTIONS 1-9: 0
SUM OF ALL RESPONSES TO PHQ QUESTIONS 1-9: 0

## 2024-03-11 NOTE — PROGRESS NOTES
03/11/24     Carlos Bashir Jr  is a 73 y.o. male     Chief Complaint   Patient presents with    Follow-up     1 year       HPI  Patient presents for a follow-up  office visit.  He has a past medical history significant for mild nonobstructive coronary disease assessed on cardiac catheterization back in 2007.      In January 2019, he was complaining of chest pain and exertional dyspnea.  As result he underwent both an echocardiogram and a pharmacologic nuclear stress test, both of which were unremarkable.  His nuclear stress test was a low risk, no ischemia or infarction, EF 54%.  His echocardiogram did not show any valvular heart disease, preserved LV function with a mildly dilated aortic root.    Patient underwent an exercise nuclear stress test in November 2022 to evaluate nonspecific T wave inversions on his EKG.  He exercised for 9-1/2 minutes using the Fito protocol.  He had no chest pain or worsening EKG changes.  His perfusion imaging was negative for ischemia, EF 64%.    He was found to have mild aneurysmal dilatation of his abdominal aorta measuring 3.4 cm in diameter which was an incidental finding on a CT scan in 2023.  The patient was last seen in our office 1 year ago.  Since last visit, he states has been feeling well.  He did note some chest discomfort on the left side which he notices for about a day after lifting large loads.  He does not notice as much when he is performing the physical activity.  He has not noted any change in his activity tolerance.  He is still smoking but has cut down to about three quarters a pack of cigarettes per day.    Past Medical History:   Diagnosis Date    Benign hypertensive heart disease without heart failure     Bilateral cataracts     removed in 2005    CAD (coronary artery disease), native coronary artery     mild by invasive eval (oct 2007)    Calculus of kidney     ED (erectile dysfunction)     GERD (gastroesophageal reflux disease)     Gross hematuria

## 2024-03-11 NOTE — PROGRESS NOTES
Carlos Gardnertiffanie Rush presents today for   Chief Complaint   Patient presents with    Follow-up     1 year       Carlos Bashir Jr preferred language for health care discussion is english/other.    Is someone accompanying this pt? no    Is the patient using any DME equipment during OV? no    Depression Screening:  Depression: Not at risk (3/14/2023)    PHQ-2     PHQ-2 Score: 0        Learning Assessment:  Who is the primary learner? Patient    What is the preferred language for health care of the primary learner? ENGLISH    How does the primary learner prefer to learn new concepts? DEMONSTRATION    Answered By patient    Relationship to Learner SELF           Pt currently taking Anticoagulant therapy? no    Pt currently taking Antiplatelet therapy ? no      Coordination of Care:  1. Have you been to the ER, urgent care clinic since your last visit? Hospitalized since your last visit? no    2. Have you seen or consulted any other health care providers outside of the [facility] since your last visit? Include any pap smears or colon screening. no

## 2024-07-18 PROBLEM — G60.9 IDIOPATHIC PERIPHERAL NEUROPATHY: Status: ACTIVE | Noted: 2022-12-16

## 2024-07-18 PROBLEM — I70.0 AORTIC ATHEROSCLEROSIS (HCC): Chronic | Status: ACTIVE | Noted: 2017-04-21

## 2024-07-18 PROBLEM — E78.2 MIXED HYPERLIPIDEMIA: Chronic | Status: ACTIVE | Noted: 2021-12-15

## 2024-07-18 PROBLEM — I77.811 AORTIC ECTASIA, ABDOMINAL (HCC): Chronic | Status: ACTIVE | Noted: 2020-12-14

## 2024-07-18 PROBLEM — I10 ESSENTIAL HYPERTENSION: Chronic | Status: ACTIVE | Noted: 2017-04-11

## 2024-07-18 PROBLEM — R42 DIZZINESS: Status: ACTIVE | Noted: 2020-10-03

## 2024-07-18 PROBLEM — E78.5 DYSLIPIDEMIA: Status: ACTIVE | Noted: 2020-12-14

## 2024-07-18 PROBLEM — Z86.73 HISTORY OF CARDIOEMBOLIC CEREBROVASCULAR ACCIDENT (CVA): Status: ACTIVE | Noted: 2019-06-11

## 2024-07-18 PROBLEM — I71.43 INFRARENAL ABDOMINAL AORTIC ANEURYSM (AAA) WITHOUT RUPTURE (HCC): Status: ACTIVE | Noted: 2022-12-16

## 2024-07-18 PROBLEM — M79.2 PERIPHERAL NEUROPATHIC PAIN: Chronic | Status: ACTIVE | Noted: 2020-12-14

## 2024-12-20 ENCOUNTER — TELEPHONE (OUTPATIENT)
Age: 74
End: 2024-12-20

## 2024-12-20 NOTE — TELEPHONE ENCOUNTER
Cardiac Clearance : Left Shoulder Arthroscopic Labral Debridement, Subacromial decompression and open subpectoral biceps tenodesis, Date TBD, [facility]  Will discuss with Dr. Camp    Verbal order and read back per Luis Camp MD  Low risk

## 2025-01-06 ENCOUNTER — OFFICE VISIT (OUTPATIENT)
Age: 75
End: 2025-01-06
Payer: MEDICARE

## 2025-01-06 VITALS
DIASTOLIC BLOOD PRESSURE: 76 MMHG | WEIGHT: 232 LBS | SYSTOLIC BLOOD PRESSURE: 130 MMHG | OXYGEN SATURATION: 98 % | HEART RATE: 79 BPM | HEIGHT: 75 IN | BODY MASS INDEX: 28.85 KG/M2

## 2025-01-06 DIAGNOSIS — R07.9 CHEST PAIN, UNSPECIFIED TYPE: Primary | ICD-10-CM

## 2025-01-06 DIAGNOSIS — R94.31 ABNORMAL ELECTROCARDIOGRAM (ECG) (EKG): ICD-10-CM

## 2025-01-06 DIAGNOSIS — F17.200 NICOTINE DEPENDENCE, UNCOMPLICATED, UNSPECIFIED NICOTINE PRODUCT TYPE: ICD-10-CM

## 2025-01-06 DIAGNOSIS — I71.40 ABDOMINAL AORTIC ANEURYSM (AAA) WITHOUT RUPTURE, UNSPECIFIED PART (HCC): ICD-10-CM

## 2025-01-06 DIAGNOSIS — E78.00 PURE HYPERCHOLESTEROLEMIA, UNSPECIFIED: ICD-10-CM

## 2025-01-06 PROCEDURE — 99214 OFFICE O/P EST MOD 30 MIN: CPT | Performed by: INTERNAL MEDICINE

## 2025-01-06 PROCEDURE — 1159F MED LIST DOCD IN RCRD: CPT | Performed by: INTERNAL MEDICINE

## 2025-01-06 PROCEDURE — 3078F DIAST BP <80 MM HG: CPT | Performed by: INTERNAL MEDICINE

## 2025-01-06 PROCEDURE — G8427 DOCREV CUR MEDS BY ELIG CLIN: HCPCS | Performed by: INTERNAL MEDICINE

## 2025-01-06 PROCEDURE — 1160F RVW MEDS BY RX/DR IN RCRD: CPT | Performed by: INTERNAL MEDICINE

## 2025-01-06 PROCEDURE — 3075F SYST BP GE 130 - 139MM HG: CPT | Performed by: INTERNAL MEDICINE

## 2025-01-06 PROCEDURE — G8417 CALC BMI ABV UP PARAM F/U: HCPCS | Performed by: INTERNAL MEDICINE

## 2025-01-06 PROCEDURE — 3017F COLORECTAL CA SCREEN DOC REV: CPT | Performed by: INTERNAL MEDICINE

## 2025-01-06 PROCEDURE — 1123F ACP DISCUSS/DSCN MKR DOCD: CPT | Performed by: INTERNAL MEDICINE

## 2025-01-06 PROCEDURE — 4004F PT TOBACCO SCREEN RCVD TLK: CPT | Performed by: INTERNAL MEDICINE

## 2025-01-06 PROCEDURE — 1126F AMNT PAIN NOTED NONE PRSNT: CPT | Performed by: INTERNAL MEDICINE

## 2025-01-06 ASSESSMENT — ANXIETY QUESTIONNAIRES
3. WORRYING TOO MUCH ABOUT DIFFERENT THINGS: NOT AT ALL
7. FEELING AFRAID AS IF SOMETHING AWFUL MIGHT HAPPEN: NOT AT ALL
6. BECOMING EASILY ANNOYED OR IRRITABLE: NOT AT ALL
1. FEELING NERVOUS, ANXIOUS, OR ON EDGE: NOT AT ALL
GAD7 TOTAL SCORE: 0
5. BEING SO RESTLESS THAT IT IS HARD TO SIT STILL: NOT AT ALL
2. NOT BEING ABLE TO STOP OR CONTROL WORRYING: NOT AT ALL
4. TROUBLE RELAXING: NOT AT ALL

## 2025-01-06 ASSESSMENT — PATIENT HEALTH QUESTIONNAIRE - PHQ9
1. LITTLE INTEREST OR PLEASURE IN DOING THINGS: NOT AT ALL
SUM OF ALL RESPONSES TO PHQ9 QUESTIONS 1 & 2: 0
SUM OF ALL RESPONSES TO PHQ QUESTIONS 1-9: 0
2. FEELING DOWN, DEPRESSED OR HOPELESS: NOT AT ALL
SUM OF ALL RESPONSES TO PHQ QUESTIONS 1-9: 0

## 2025-01-06 ASSESSMENT — ENCOUNTER SYMPTOMS
ABDOMINAL PAIN: 0
NAUSEA: 0
SHORTNESS OF BREATH: 1
SORE THROAT: 0
ABDOMINAL DISTENTION: 0
COUGH: 0
VOMITING: 0

## 2025-01-06 NOTE — PROGRESS NOTES
Carlos Bashir Jr presents today for   Chief Complaint   Patient presents with    Follow-up     Add on: surgical clearance     Cardiac Clearance     Left shoulder surgery with Dr. Moreno date to be determined       Carlos Bashir Jr preferred language for health care discussion is english/other.    Is someone accompanying this pt? no    Is the patient using any DME equipment during OV? no    Depression Screening:  Depression: Not at risk (1/6/2025)    PHQ-2     PHQ-2 Score: 0        Learning Assessment:  Who is the primary learner? Patient    What is the preferred language for health care of the primary learner? ENGLISH    How does the primary learner prefer to learn new concepts? DEMONSTRATION    Answered By patient    Relationship to Learner SELF           Pt currently taking Anticoagulant therapy? no    Pt currently taking Antiplatelet therapy ? no      Coordination of Care:  1. Have you been to the ER, urgent care clinic since your last visit? Hospitalized since your last visit? no    2. Have you seen or consulted any other health care providers outside of the [facility] since your last visit? Include any pap smears or colon screening. no    
hypertensive heart disease without heart failure     Bilateral cataracts     removed in 2005    CAD (coronary artery disease), native coronary artery     mild by invasive eval (oct 2007)    Calculus of kidney     ED (erectile dysfunction)     GERD (gastroesophageal reflux disease)     Gross hematuria     Hypertension     Incomplete bladder emptying     Nephrolithiasis     Normal cardiac stress test 12/2015    Normal stress echocardiogram.  Total exercise time 12 minutes, EF 60%, no regional wall motion abnormalities.    Peyronie's disease     Pure hypercholesterolemia     S/P cardiac cath 10/05/2007    LM patent.  pLAD <20% plaquing.  oLCX 30%.  RCA patent.  LVEDP 20.  EF 60%.  Mild arterial hypertension.    Tobacco use disorder     ongoing (July 2011)    Urinary retention      Current Outpatient Medications   Medication Sig Dispense Refill    acetaminophen (TYLENOL) 325 MG tablet Take 1 tablet by mouth every 4 hours as needed      amLODIPine (NORVASC) 10 MG tablet Take 1 tablet by mouth daily      atorvastatin (LIPITOR) 40 MG tablet Take 1 tablet by mouth daily      hydroCHLOROthiazide (MICROZIDE) 12.5 MG capsule Take 1 capsule by mouth daily      ramipril (ALTACE) 10 MG capsule Take 1 capsule by mouth daily       No current facility-administered medications for this visit.     No Known Allergies  Social History     Tobacco Use    Smoking status: Every Day     Current packs/day: 0.50     Average packs/day: 0.5 packs/day for 30.0 years (15.0 ttl pk-yrs)     Types: Cigarettes    Smokeless tobacco: Never   Vaping Use    Vaping status: Never Used   Substance Use Topics    Alcohol use: Yes    Drug use: No     Family History   Problem Relation Age of Onset    Pacemaker Mother     Heart Attack Father     No Known Problems Sister     No Known Problems Brother     No Known Problems Maternal Aunt     No Known Problems Maternal Uncle     No Known Problems Paternal Aunt     No Known Problems Paternal Uncle     No Known

## 2025-03-10 ENCOUNTER — PATIENT MESSAGE (OUTPATIENT)
Age: 75
End: 2025-03-10

## 2025-04-21 ENCOUNTER — PATIENT MESSAGE (OUTPATIENT)
Age: 75
End: 2025-04-21

## 2025-04-22 ENCOUNTER — OFFICE VISIT (OUTPATIENT)
Age: 75
End: 2025-04-22
Payer: MEDICARE

## 2025-04-22 VITALS
OXYGEN SATURATION: 96 % | SYSTOLIC BLOOD PRESSURE: 122 MMHG | HEIGHT: 75 IN | HEART RATE: 59 BPM | DIASTOLIC BLOOD PRESSURE: 74 MMHG | BODY MASS INDEX: 28.1 KG/M2 | WEIGHT: 226 LBS

## 2025-04-22 DIAGNOSIS — I71.40 ABDOMINAL AORTIC ANEURYSM (AAA) WITHOUT RUPTURE, UNSPECIFIED PART: ICD-10-CM

## 2025-04-22 DIAGNOSIS — I25.10 ATHEROSCLEROSIS OF NATIVE CORONARY ARTERY OF NATIVE HEART WITHOUT ANGINA PECTORIS: Primary | ICD-10-CM

## 2025-04-22 DIAGNOSIS — E78.00 PURE HYPERCHOLESTEROLEMIA, UNSPECIFIED: ICD-10-CM

## 2025-04-22 PROCEDURE — 3074F SYST BP LT 130 MM HG: CPT | Performed by: INTERNAL MEDICINE

## 2025-04-22 PROCEDURE — 4004F PT TOBACCO SCREEN RCVD TLK: CPT | Performed by: INTERNAL MEDICINE

## 2025-04-22 PROCEDURE — G8427 DOCREV CUR MEDS BY ELIG CLIN: HCPCS | Performed by: INTERNAL MEDICINE

## 2025-04-22 PROCEDURE — 99214 OFFICE O/P EST MOD 30 MIN: CPT | Performed by: INTERNAL MEDICINE

## 2025-04-22 PROCEDURE — 3017F COLORECTAL CA SCREEN DOC REV: CPT | Performed by: INTERNAL MEDICINE

## 2025-04-22 PROCEDURE — 3078F DIAST BP <80 MM HG: CPT | Performed by: INTERNAL MEDICINE

## 2025-04-22 PROCEDURE — 1126F AMNT PAIN NOTED NONE PRSNT: CPT | Performed by: INTERNAL MEDICINE

## 2025-04-22 PROCEDURE — 1160F RVW MEDS BY RX/DR IN RCRD: CPT | Performed by: INTERNAL MEDICINE

## 2025-04-22 PROCEDURE — 1159F MED LIST DOCD IN RCRD: CPT | Performed by: INTERNAL MEDICINE

## 2025-04-22 PROCEDURE — 1123F ACP DISCUSS/DSCN MKR DOCD: CPT | Performed by: INTERNAL MEDICINE

## 2025-04-22 PROCEDURE — G8417 CALC BMI ABV UP PARAM F/U: HCPCS | Performed by: INTERNAL MEDICINE

## 2025-04-22 ASSESSMENT — ENCOUNTER SYMPTOMS
VOMITING: 0
SHORTNESS OF BREATH: 1
SORE THROAT: 0
COUGH: 0
ABDOMINAL PAIN: 0
ABDOMINAL DISTENTION: 0
NAUSEA: 0

## 2025-04-22 ASSESSMENT — PATIENT HEALTH QUESTIONNAIRE - PHQ9
SUM OF ALL RESPONSES TO PHQ QUESTIONS 1-9: 0
2. FEELING DOWN, DEPRESSED OR HOPELESS: NOT AT ALL
SUM OF ALL RESPONSES TO PHQ QUESTIONS 1-9: 0
1. LITTLE INTEREST OR PLEASURE IN DOING THINGS: NOT AT ALL

## 2025-04-22 ASSESSMENT — ANXIETY QUESTIONNAIRES
7. FEELING AFRAID AS IF SOMETHING AWFUL MIGHT HAPPEN: NOT AT ALL
2. NOT BEING ABLE TO STOP OR CONTROL WORRYING: NOT AT ALL
1. FEELING NERVOUS, ANXIOUS, OR ON EDGE: NOT AT ALL
4. TROUBLE RELAXING: NOT AT ALL
3. WORRYING TOO MUCH ABOUT DIFFERENT THINGS: NOT AT ALL
6. BECOMING EASILY ANNOYED OR IRRITABLE: NOT AT ALL
5. BEING SO RESTLESS THAT IT IS HARD TO SIT STILL: NOT AT ALL
GAD7 TOTAL SCORE: 0

## 2025-04-22 NOTE — PROGRESS NOTES
No     Family History   Problem Relation Age of Onset    Pacemaker Mother     Heart Attack Father     No Known Problems Sister     No Known Problems Brother     No Known Problems Maternal Aunt     No Known Problems Maternal Uncle     No Known Problems Paternal Aunt     No Known Problems Paternal Uncle     No Known Problems Maternal Grandmother     No Known Problems Maternal Grandfather     No Known Problems Paternal Grandmother     No Known Problems Paternal Grandfather     No Known Problems Other          Review of Systems   Constitutional:  Negative for chills, fatigue and fever.   HENT:  Negative for congestion, hearing loss, nosebleeds and sore throat.    Eyes:  Negative for visual disturbance.   Respiratory:  Positive for shortness of breath. Negative for cough.    Cardiovascular:  Negative for chest pain, palpitations and leg swelling.   Gastrointestinal:  Negative for abdominal distention, abdominal pain, nausea and vomiting.   Endocrine: Negative for cold intolerance and heat intolerance.   Genitourinary:  Negative for dysuria.   Musculoskeletal:  Negative for arthralgias.   Skin:  Negative for rash.   Neurological:  Negative for dizziness, syncope, weakness and headaches.   Hematological:  Does not bruise/bleed easily.   Psychiatric/Behavioral:  Negative for suicidal ideas.          /74 (BP Site: Left Upper Arm, Patient Position: Sitting, BP Cuff Size: Medium Adult)   Pulse 59   Ht 1.905 m (6' 3\")   Wt 102.5 kg (226 lb)   SpO2 96%   BMI 28.25 kg/m²     Objective:   Physical Exam  Constitutional:       General: He is not in acute distress.  HENT:      Head: Normocephalic.   Neck:      Vascular: No carotid bruit or JVD.   Cardiovascular:      Rate and Rhythm: Normal rate and regular rhythm. No extrasystoles are present.     Heart sounds: No murmur heard.     No gallop.   Pulmonary:      Effort: Pulmonary effort is normal.      Breath sounds: No wheezing, rhonchi or rales.   Abdominal:      General:

## (undated) DEVICE — MEDI-VAC NON-CONDUCTIVE SUCTION TUBING: Brand: CARDINAL HEALTH

## (undated) DEVICE — Device

## (undated) DEVICE — AIRLIFE™ NASAL OXYGEN CANNULA CURVED, NONFLARED TIP WITH 14 FOOT (4.3 M) CRUSH-RESISTANT TUBING, OVER-THE-EAR STYLE: Brand: AIRLIFE™

## (undated) DEVICE — SNARE ENDOSCP L240CM LOOP W27MM SHTH DIA2.4MM WRK CHN 2.8MM

## (undated) DEVICE — CATH IV SAFE STR 22GX1IN BLU -- PROTECTIV PLUS

## (undated) DEVICE — FLEX ADVANTAGE 1500CC: Brand: FLEX ADVANTAGE

## (undated) DEVICE — TRAP SPEC COLL POLYP POLYSTYR --